# Patient Record
Sex: MALE | Race: WHITE | Employment: OTHER | URBAN - METROPOLITAN AREA
[De-identification: names, ages, dates, MRNs, and addresses within clinical notes are randomized per-mention and may not be internally consistent; named-entity substitution may affect disease eponyms.]

---

## 2017-01-04 ENCOUNTER — ANESTHESIA EVENT (OUTPATIENT)
Dept: MEDSURG UNIT | Age: 72
End: 2017-01-04
Payer: MEDICARE

## 2017-01-05 ENCOUNTER — ANESTHESIA (OUTPATIENT)
Dept: MEDSURG UNIT | Age: 72
End: 2017-01-05
Payer: MEDICARE

## 2017-01-05 ENCOUNTER — SURGERY (OUTPATIENT)
Age: 72
End: 2017-01-05

## 2017-01-05 PROCEDURE — 74011000250 HC RX REV CODE- 250

## 2017-01-05 PROCEDURE — 74011250636 HC RX REV CODE- 250/636: Performed by: ANESTHESIOLOGY

## 2017-01-05 PROCEDURE — 74011250636 HC RX REV CODE- 250/636: Performed by: PHYSICIAN ASSISTANT

## 2017-01-05 PROCEDURE — 74011250636 HC RX REV CODE- 250/636

## 2017-01-05 PROCEDURE — 77030008684 HC TU ET CUF COVD -B: Performed by: ANESTHESIOLOGY

## 2017-01-05 PROCEDURE — 77030013079 HC BLNKT BAIR HGGR 3M -A: Performed by: ANESTHESIOLOGY

## 2017-01-05 PROCEDURE — 77030026438 HC STYL ET INTUB CARD -A: Performed by: ANESTHESIOLOGY

## 2017-01-05 RX ORDER — MIDAZOLAM HYDROCHLORIDE 1 MG/ML
INJECTION, SOLUTION INTRAMUSCULAR; INTRAVENOUS AS NEEDED
Status: DISCONTINUED | OUTPATIENT
Start: 2017-01-05 | End: 2017-01-05 | Stop reason: HOSPADM

## 2017-01-05 RX ORDER — ONDANSETRON 2 MG/ML
INJECTION INTRAMUSCULAR; INTRAVENOUS AS NEEDED
Status: DISCONTINUED | OUTPATIENT
Start: 2017-01-05 | End: 2017-01-05 | Stop reason: HOSPADM

## 2017-01-05 RX ORDER — FENTANYL CITRATE 50 UG/ML
INJECTION, SOLUTION INTRAMUSCULAR; INTRAVENOUS AS NEEDED
Status: DISCONTINUED | OUTPATIENT
Start: 2017-01-05 | End: 2017-01-05 | Stop reason: HOSPADM

## 2017-01-05 RX ORDER — DEXAMETHASONE SODIUM PHOSPHATE 4 MG/ML
INJECTION, SOLUTION INTRA-ARTICULAR; INTRALESIONAL; INTRAMUSCULAR; INTRAVENOUS; SOFT TISSUE AS NEEDED
Status: DISCONTINUED | OUTPATIENT
Start: 2017-01-05 | End: 2017-01-05 | Stop reason: HOSPADM

## 2017-01-05 RX ORDER — NEOSTIGMINE METHYLSULFATE 1 MG/ML
INJECTION INTRAVENOUS AS NEEDED
Status: DISCONTINUED | OUTPATIENT
Start: 2017-01-05 | End: 2017-01-05 | Stop reason: HOSPADM

## 2017-01-05 RX ORDER — HYDROMORPHONE HYDROCHLORIDE 2 MG/ML
INJECTION, SOLUTION INTRAMUSCULAR; INTRAVENOUS; SUBCUTANEOUS AS NEEDED
Status: DISCONTINUED | OUTPATIENT
Start: 2017-01-05 | End: 2017-01-05 | Stop reason: HOSPADM

## 2017-01-05 RX ORDER — PHENYLEPHRINE HCL IN 0.9% NACL 0.4MG/10ML
SYRINGE (ML) INTRAVENOUS AS NEEDED
Status: DISCONTINUED | OUTPATIENT
Start: 2017-01-05 | End: 2017-01-05 | Stop reason: HOSPADM

## 2017-01-05 RX ORDER — GLYCOPYRROLATE 0.2 MG/ML
INJECTION INTRAMUSCULAR; INTRAVENOUS AS NEEDED
Status: DISCONTINUED | OUTPATIENT
Start: 2017-01-05 | End: 2017-01-05 | Stop reason: HOSPADM

## 2017-01-05 RX ORDER — LIDOCAINE HYDROCHLORIDE 20 MG/ML
INJECTION, SOLUTION EPIDURAL; INFILTRATION; INTRACAUDAL; PERINEURAL AS NEEDED
Status: DISCONTINUED | OUTPATIENT
Start: 2017-01-05 | End: 2017-01-05 | Stop reason: HOSPADM

## 2017-01-05 RX ORDER — PROPOFOL 10 MG/ML
INJECTION, EMULSION INTRAVENOUS AS NEEDED
Status: DISCONTINUED | OUTPATIENT
Start: 2017-01-05 | End: 2017-01-05 | Stop reason: HOSPADM

## 2017-01-05 RX ORDER — SUCCINYLCHOLINE CHLORIDE 20 MG/ML
INJECTION INTRAMUSCULAR; INTRAVENOUS AS NEEDED
Status: DISCONTINUED | OUTPATIENT
Start: 2017-01-05 | End: 2017-01-05 | Stop reason: HOSPADM

## 2017-01-05 RX ORDER — ROCURONIUM BROMIDE 10 MG/ML
INJECTION, SOLUTION INTRAVENOUS AS NEEDED
Status: DISCONTINUED | OUTPATIENT
Start: 2017-01-05 | End: 2017-01-05 | Stop reason: HOSPADM

## 2017-01-05 RX ADMIN — HYDROMORPHONE HYDROCHLORIDE 0.2 MG: 2 INJECTION, SOLUTION INTRAMUSCULAR; INTRAVENOUS; SUBCUTANEOUS at 09:04

## 2017-01-05 RX ADMIN — HYDROMORPHONE HYDROCHLORIDE 0.2 MG: 2 INJECTION, SOLUTION INTRAMUSCULAR; INTRAVENOUS; SUBCUTANEOUS at 09:06

## 2017-01-05 RX ADMIN — ONDANSETRON 4 MG: 2 INJECTION INTRAMUSCULAR; INTRAVENOUS at 10:20

## 2017-01-05 RX ADMIN — NEOSTIGMINE METHYLSULFATE 0.5 MG: 1 INJECTION INTRAVENOUS at 10:20

## 2017-01-05 RX ADMIN — GLYCOPYRROLATE 0.2 MG: 0.2 INJECTION INTRAMUSCULAR; INTRAVENOUS at 08:22

## 2017-01-05 RX ADMIN — ROCURONIUM BROMIDE 30 MG: 10 INJECTION, SOLUTION INTRAVENOUS at 08:37

## 2017-01-05 RX ADMIN — SODIUM CHLORIDE, SODIUM LACTATE, POTASSIUM CHLORIDE AND CALCIUM CHLORIDE 3000 ML: 600; 310; 30; 20 SOLUTION IRRIGATION at 09:22

## 2017-01-05 RX ADMIN — PROPOFOL 50 MG: 10 INJECTION, EMULSION INTRAVENOUS at 09:52

## 2017-01-05 RX ADMIN — FENTANYL CITRATE 50 MCG: 50 INJECTION, SOLUTION INTRAMUSCULAR; INTRAVENOUS at 09:10

## 2017-01-05 RX ADMIN — Medication 80 MCG: at 08:48

## 2017-01-05 RX ADMIN — PROPOFOL 70 MG: 10 INJECTION, EMULSION INTRAVENOUS at 08:29

## 2017-01-05 RX ADMIN — SUCCINYLCHOLINE CHLORIDE 140 MG: 20 INJECTION INTRAMUSCULAR; INTRAVENOUS at 08:29

## 2017-01-05 RX ADMIN — FENTANYL CITRATE 50 MCG: 50 INJECTION, SOLUTION INTRAMUSCULAR; INTRAVENOUS at 08:40

## 2017-01-05 RX ADMIN — ROCURONIUM BROMIDE 5 MG: 10 INJECTION, SOLUTION INTRAVENOUS at 08:29

## 2017-01-05 RX ADMIN — LIDOCAINE HYDROCHLORIDE 80 MG: 20 INJECTION, SOLUTION EPIDURAL; INFILTRATION; INTRACAUDAL; PERINEURAL at 08:29

## 2017-01-05 RX ADMIN — MIDAZOLAM HYDROCHLORIDE 2 MG: 1 INJECTION, SOLUTION INTRAMUSCULAR; INTRAVENOUS at 08:22

## 2017-01-05 RX ADMIN — CEFAZOLIN 2 G: 1 INJECTION, POWDER, FOR SOLUTION INTRAMUSCULAR; INTRAVENOUS; PARENTERAL at 08:40

## 2017-01-05 RX ADMIN — SODIUM CHLORIDE, SODIUM LACTATE, POTASSIUM CHLORIDE, AND CALCIUM CHLORIDE: 600; 310; 30; 20 INJECTION, SOLUTION INTRAVENOUS at 10:22

## 2017-01-05 RX ADMIN — FENTANYL CITRATE 25 MCG: 50 INJECTION, SOLUTION INTRAMUSCULAR; INTRAVENOUS at 09:52

## 2017-01-05 RX ADMIN — FENTANYL CITRATE 25 MCG: 50 INJECTION, SOLUTION INTRAMUSCULAR; INTRAVENOUS at 09:24

## 2017-01-05 RX ADMIN — GLYCOPYRROLATE 0.4 MG: 0.2 INJECTION INTRAMUSCULAR; INTRAVENOUS at 10:20

## 2017-01-05 RX ADMIN — Medication 80 MCG: at 10:02

## 2017-01-05 RX ADMIN — FENTANYL CITRATE 50 MCG: 50 INJECTION, SOLUTION INTRAMUSCULAR; INTRAVENOUS at 09:02

## 2017-01-05 RX ADMIN — DEXAMETHASONE SODIUM PHOSPHATE 8 MG: 4 INJECTION, SOLUTION INTRA-ARTICULAR; INTRALESIONAL; INTRAMUSCULAR; INTRAVENOUS; SOFT TISSUE at 08:29

## 2017-01-05 RX ADMIN — SODIUM CHLORIDE, SODIUM LACTATE, POTASSIUM CHLORIDE, AND CALCIUM CHLORIDE 3000 ML: 600; 310; 30; 20 INJECTION, SOLUTION INTRAVENOUS at 09:21

## 2017-01-05 NOTE — ANESTHESIA POSTPROCEDURE EVALUATION
Post-Anesthesia Evaluation and Assessment    Patient: Andrew Nguyen MRN: [id number]  SSN: [id number]    YOB: 1945  Age: 80 y.o. Sex: male       Cardiovascular Function/Vital Signs  Visit Vitals    /66    Pulse 71    Temp 36.4 °C (97.5 °F)    Resp 16    Ht 5' 10\" (1.778 m)    Wt 74.8 kg (164 lb 14.5 oz)    SpO2 95%    BMI 23.66 kg/m2       Patient is status post general anesthesia for Procedure(s):  RIGHT SHOULDER ARTHROSCOPY SUBACROMIAL DECOMPRESSION, DISTAL CLAVICLE EXCISION, ROTATOR CUFF REPAIR. Nausea/Vomiting: None    Postoperative hydration reviewed and adequate. Pain:  Pain Scale 1: Numeric (0 - 10) (01/05/17 0650)  Pain Intensity 1: 4 (01/05/17 0650)   Managed    Neurological Status:   Neuro (WDL): Within Defined Limits (01/05/17 0823)   At baseline    Mental Status and Level of Consciousness: Arousable    Pulmonary Status:   O2 Device: Nasal cannula (01/05/17 1046)   Adequate oxygenation and airway patent    Complications related to anesthesia: None    Post-anesthesia assessment completed.  No concerns    Signed By: Clint Ramírez MD     January 5, 2017

## 2017-01-05 NOTE — ANESTHESIA PREPROCEDURE EVALUATION
Anesthetic History   No history of anesthetic complications            Review of Systems / Medical History  Patient summary reviewed, nursing notes reviewed and pertinent labs reviewed    Pulmonary            Asthma        Neuro/Psych   Within defined limits           Cardiovascular            Dysrhythmias            GI/Hepatic/Renal  Within defined limits              Endo/Other        Arthritis     Other Findings            Physical Exam    Airway  Mallampati: II  TM Distance: > 6 cm  Neck ROM: normal range of motion   Mouth opening: Normal     Cardiovascular  Regular rate and rhythm,  S1 and S2 normal,  no murmur, click, rub, or gallop             Dental  No notable dental hx       Pulmonary  Breath sounds clear to auscultation               Abdominal  GI exam deferred       Other Findings            Anesthetic Plan    ASA: 3  Patient did not consent to regional anesthesiaAnesthesia type: general          Induction: Intravenous  Anesthetic plan and risks discussed with: Patient

## 2017-04-13 ENCOUNTER — HOSPITAL ENCOUNTER (OUTPATIENT)
Dept: ULTRASOUND IMAGING | Age: 72
Discharge: HOME | End: 2017-04-13
Attending: FAMILY MEDICINE
Payer: MEDICARE

## 2017-04-13 DIAGNOSIS — R11.0 NAUSEA: ICD-10-CM

## 2017-04-13 PROCEDURE — 76705 ECHO EXAM OF ABDOMEN: CPT

## 2017-04-18 ENCOUNTER — HOSPITAL ENCOUNTER (OUTPATIENT)
Dept: MRI IMAGING | Age: 72
Discharge: HOME | End: 2017-04-18
Attending: ORTHOPAEDIC SURGERY
Payer: MEDICARE

## 2017-04-18 DIAGNOSIS — S46.011A TRAUMATIC TEAR OF RIGHT ROTATOR CUFF, INITIAL ENCOUNTER: ICD-10-CM

## 2017-04-18 PROCEDURE — 73221 MRI JOINT UPR EXTREM W/O DYE: CPT

## 2017-04-26 ENCOUNTER — HOSPITAL ENCOUNTER (OUTPATIENT)
Age: 72
Setting detail: OUTPATIENT SURGERY
Discharge: HOME | End: 2017-04-26
Attending: INTERNAL MEDICINE | Admitting: INTERNAL MEDICINE
Payer: MEDICARE

## 2017-04-26 ENCOUNTER — ANESTHESIA (OUTPATIENT)
Dept: ENDOSCOPY | Age: 72
End: 2017-04-26
Payer: MEDICARE

## 2017-04-26 ENCOUNTER — ANESTHESIA EVENT (OUTPATIENT)
Dept: ENDOSCOPY | Age: 72
End: 2017-04-26
Payer: MEDICARE

## 2017-04-26 VITALS
TEMPERATURE: 97.8 F | OXYGEN SATURATION: 99 % | SYSTOLIC BLOOD PRESSURE: 155 MMHG | RESPIRATION RATE: 15 BRPM | HEART RATE: 55 BPM | DIASTOLIC BLOOD PRESSURE: 68 MMHG

## 2017-04-26 PROCEDURE — 74011000250 HC RX REV CODE- 250

## 2017-04-26 PROCEDURE — 74011250636 HC RX REV CODE- 250/636

## 2017-04-26 PROCEDURE — 76040000019: Performed by: INTERNAL MEDICINE

## 2017-04-26 PROCEDURE — 88305 TISSUE EXAM BY PATHOLOGIST: CPT | Performed by: INTERNAL MEDICINE

## 2017-04-26 PROCEDURE — 77030009426 HC FCPS BIOP ENDOSC BSC -B: Performed by: INTERNAL MEDICINE

## 2017-04-26 PROCEDURE — 76060000031 HC ANESTHESIA FIRST 0.5 HR: Performed by: INTERNAL MEDICINE

## 2017-04-26 RX ORDER — SODIUM CHLORIDE 9 MG/ML
150 INJECTION, SOLUTION INTRAVENOUS CONTINUOUS
Status: DISCONTINUED | OUTPATIENT
Start: 2017-04-26 | End: 2017-04-26 | Stop reason: HOSPADM

## 2017-04-26 RX ORDER — ATROPINE SULFATE 0.1 MG/ML
0.5 INJECTION INTRAVENOUS
Status: DISCONTINUED | OUTPATIENT
Start: 2017-04-26 | End: 2017-04-26 | Stop reason: HOSPADM

## 2017-04-26 RX ORDER — SODIUM CHLORIDE 0.9 % (FLUSH) 0.9 %
5-10 SYRINGE (ML) INJECTION AS NEEDED
Status: DISCONTINUED | OUTPATIENT
Start: 2017-04-26 | End: 2017-04-26 | Stop reason: HOSPADM

## 2017-04-26 RX ORDER — SODIUM CHLORIDE 0.9 % (FLUSH) 0.9 %
5-10 SYRINGE (ML) INJECTION EVERY 8 HOURS
Status: DISCONTINUED | OUTPATIENT
Start: 2017-04-26 | End: 2017-04-26 | Stop reason: HOSPADM

## 2017-04-26 RX ORDER — SODIUM CHLORIDE 9 MG/ML
INJECTION, SOLUTION INTRAVENOUS
Status: DISCONTINUED | OUTPATIENT
Start: 2017-04-26 | End: 2017-04-26 | Stop reason: HOSPADM

## 2017-04-26 RX ORDER — DEXTROMETHORPHAN/PSEUDOEPHED 2.5-7.5/.8
1.2 DROPS ORAL
Status: DISCONTINUED | OUTPATIENT
Start: 2017-04-26 | End: 2017-04-26 | Stop reason: HOSPADM

## 2017-04-26 RX ORDER — EPINEPHRINE 0.1 MG/ML
1 INJECTION INTRACARDIAC; INTRAVENOUS
Status: DISCONTINUED | OUTPATIENT
Start: 2017-04-26 | End: 2017-04-26 | Stop reason: HOSPADM

## 2017-04-26 RX ORDER — LIDOCAINE HYDROCHLORIDE 20 MG/ML
INJECTION, SOLUTION EPIDURAL; INFILTRATION; INTRACAUDAL; PERINEURAL AS NEEDED
Status: DISCONTINUED | OUTPATIENT
Start: 2017-04-26 | End: 2017-04-26 | Stop reason: HOSPADM

## 2017-04-26 RX ORDER — PROPOFOL 10 MG/ML
INJECTION, EMULSION INTRAVENOUS AS NEEDED
Status: DISCONTINUED | OUTPATIENT
Start: 2017-04-26 | End: 2017-04-26 | Stop reason: HOSPADM

## 2017-04-26 RX ORDER — MIDAZOLAM HYDROCHLORIDE 1 MG/ML
.25-5 INJECTION, SOLUTION INTRAMUSCULAR; INTRAVENOUS
Status: DISCONTINUED | OUTPATIENT
Start: 2017-04-26 | End: 2017-04-26 | Stop reason: HOSPADM

## 2017-04-26 RX ADMIN — PROPOFOL 100 MG: 10 INJECTION, EMULSION INTRAVENOUS at 14:04

## 2017-04-26 RX ADMIN — SODIUM CHLORIDE: 9 INJECTION, SOLUTION INTRAVENOUS at 13:52

## 2017-04-26 RX ADMIN — LIDOCAINE HYDROCHLORIDE 40 MG: 20 INJECTION, SOLUTION EPIDURAL; INFILTRATION; INTRACAUDAL; PERINEURAL at 14:00

## 2017-04-26 RX ADMIN — PROPOFOL 100 MG: 10 INJECTION, EMULSION INTRAVENOUS at 14:00

## 2017-04-26 NOTE — ANESTHESIA PREPROCEDURE EVALUATION
Anesthetic History   No history of anesthetic complications            Review of Systems / Medical History  Patient summary reviewed, nursing notes reviewed and pertinent labs reviewed    Pulmonary  Within defined limits          Asthma        Neuro/Psych   Within defined limits           Cardiovascular  Within defined limits          Dysrhythmias            GI/Hepatic/Renal  Within defined limits   GERD           Endo/Other  Within defined limits           Other Findings              Physical Exam    Airway  Mallampati: II  TM Distance: > 6 cm  Neck ROM: normal range of motion   Mouth opening: Normal     Cardiovascular  Regular rate and rhythm,  S1 and S2 normal,  no murmur, click, rub, or gallop             Dental  No notable dental hx       Pulmonary  Breath sounds clear to auscultation               Abdominal  GI exam deferred       Other Findings            Anesthetic Plan    ASA: 2  Anesthesia type: MAC          Induction: Intravenous  Anesthetic plan and risks discussed with: Patient

## 2017-04-26 NOTE — ROUTINE PROCESS
[facility]  1945  [id number]    Situation:  Verbal report received from: Arline PASTRANA  Procedure: Procedure(s):  ESOPHAGOGASTRODUODENOSCOPY (EGD)  ESOPHAGOGASTRODUODENAL (EGD) BIOPSY    Background:    Preoperative diagnosis: NAUSEA   Postoperative diagnosis: 1. Hiatal Hernia  2. Reflux Esophagitis    :  Dr. Zainab Watkins  Assistant(s): Endoscopy Technician-1: Rikcy Cherry  Endoscopy RN-1: Ana Billy RN    Specimens:   ID Type Source Tests Collected by Time Destination   1 : Lower Esophagus Biopsy Preservative   Eloisa Multani MD 4/26/2017 1409 Pathology     H. Pylori  no    Assessment:  Intra-procedure medications   Anesthesia gave intra-procedure sedation and medications, see anesthesia flow sheet yes    Intravenous fluids: NS@ KVO     Vital signs stable     Abdominal assessment: round and soft     Recommendation:  Discharge patient per MD order.     Family or Friend   Permission to share finding with family or friend yes

## 2017-04-26 NOTE — H&P
[address], [address] [place] [address]          Pre-procedure History and Physical       NAME:  Alina Banuelos   :   1945   MRN:   [id number]     CHIEF COMPLAINT/HPI: See procedure note    PMH:  Past Medical History:   Diagnosis Date    Adverse effect of anesthesia     PT STATES \" I HAD VERY LOW BLOOD PRESSURE FOR A FEW WEEKS FOLLOWING SINUS SURGERY\"    Arrhythmia     OF UNKNOWN TYPE PER PATIENT, PT STOPPED GOING TO CARDIOLOGIST    Arthritis     Asthma     AS A CHILD    CFS (chronic fatigue syndrome)     Contact dermatitis and other eczema, due to unspecified cause     GERD (gastroesophageal reflux disease)     Hiatal hernia     Rotator cuff disorder     right       PSH:  Past Surgical History:   Procedure Laterality Date    ABDOMEN SURGERY PROC UNLISTED      HERNIA REPAIR    HX APPENDECTOMY      HX COLONOSCOPY      HX HEENT  2014    SINUS SURGERY- \"CLEANING OUT\" INFECTION OF SINUSES    HX ORTHOPAEDIC Right     SHOULDER REPAIR ( PT DOESN'T KNOW)     HX ORTHOPAEDIC      AT AGE 19 , BONE SPURS/NODULE REMOVED NEAR LEFT FEMUR    HX RETINAL DETACHMENT REPAIR      LEFT EYE    HX TONSILLECTOMY         Allergies: Allergies   Allergen Reactions    Latex Itching     AND HIVES    Sulfa (Sulfonamide Antibiotics) Anaphylaxis    Broccoli Nausea and Vomiting    Chlorhexidine Rash    Diltiazem Nausea Only    Gluten Other (comments)     \" WANTS TO SLEEP 24 HOURS A DAY , AND IT AFFECTS MY HEART\", PT CAN'T ELABORATE.  Ibuprofen Nausea and Vomiting    Lactose Other (comments)     EXTREME INDIGESTION    Propafenone Other (comments)     Nausea and pain      Shellfish Derived Other (comments)     STOMACH UPSET    Tapioca Nausea and Vomiting    Wheat Other (comments)     \" WANTS TO SLEEP 24 HOURS A DAY\"       Home Medications:  Prior to Admission Medications   Prescriptions Last Dose Informant Patient Reported?  Taking?   acetaminophen-codeine (TYLENOL #3) 300-30 mg per tablet   No No   Sig: Take 1 Tab by mouth every four (4) hours as needed for Pain. Max Daily Amount: 6 Tabs. aspirin delayed-release 81 mg tablet   Yes No   Sig: Take 1 Tab by mouth daily. calcium carbonate (TUMS) 200 mg calcium (500 mg) chew   Yes No   Sig: Take 1 Tab by mouth as needed. ondansetron (ZOFRAN ODT) 4 mg disintegrating tablet   No No   Sig: Take 1 Tab by mouth every eight (8) hours as needed for Nausea. oxyCODONE-acetaminophen (PERCOCET) 5-325 mg per tablet   No No   Sig: Take 1-2 Tabs by mouth every four (4) hours as needed for Pain. Max Daily Amount: 12 Tabs.       Facility-Administered Medications: None       Hospital Medications:  Current Facility-Administered Medications   Medication Dose Route Frequency    0.9% sodium chloride infusion  150 mL/hr IntraVENous CONTINUOUS    sodium chloride (NS) flush 5-10 mL  5-10 mL IntraVENous Q8H    sodium chloride (NS) flush 5-10 mL  5-10 mL IntraVENous PRN    midazolam (VERSED) injection 0.25-5 mg  0.25-5 mg IntraVENous Multiple    simethicone (MYLICON) 28AA/0.2DC oral drops 80 mg  1.2 mL Oral Multiple    atropine injection 0.5 mg  0.5 mg IntraVENous ONCE PRN    EPINEPHrine (ADRENALIN) 0.1 mg/mL syringe 1 mg  1 mg Endoscopically ONCE PRN     Facility-Administered Medications Ordered in Other Encounters   Medication Dose Route Frequency    0.9% sodium chloride infusion   IntraVENous CONTINUOUS       Family History:  Family History   Problem Relation Age of Onset    Hypertension Mother     Stroke Mother     Diabetes Mother     Heart Disease Mother     Cancer Father      ABDOMINAL CANCER    Anesth Problems Neg Hx        Social History:  Social History   Substance Use Topics    Smoking status: Never Smoker    Smokeless tobacco: Never Used    Alcohol use No         PHYSICAL EXAM PRIOR TO SEDATION:  General: Alert, in no acute distress    Lungs:            CTA bilaterally  Heart:  Normal S1, S2    Abdomen: Soft, Non distended, Non tender. Normoactive bowel sounds. Assessment:   Stable for sedation administration.     Plan:   · Endoscopic procedure with sedation     Signed By: Kia Oh MD     4/26/2017  1:59 PM

## 2017-04-26 NOTE — IP AVS SNAPSHOT
[address] [address] 
[phone] Patient: Rosa Banuelos MRN: [id number] You are allergic to the following Allergen Reactions Latex Itching AND HIVES Sulfa (Sulfonamide Antibiotics) Anaphylaxis Broccoli Nausea and Vomiting Chlorhexidine Rash Diltiazem Nausea Only Gluten Other (comments) \" WANTS TO SLEEP 24 HOURS A DAY , AND IT AFFECTS MY HEART\", PT CAN'T ELABORATE. Ibuprofen Nausea and Vomiting Lactose Other (comments) EXTREME INDIGESTION Propafenone Other (comments) Nausea and pain Shellfish Derived Other (comments) STOMACH UPSET Tapioca Nausea and Vomiting Wheat Other (comments) \" WANTS TO SLEEP 24 HOURS A DAY\" Recent Documentation Smoking Status Never Smoker Emergency Contacts Name Discharge Info Relation Home Work Mobile Gillian Barrera  Daughter [21] [phone] Maria Eugenia Giraldo CAREGIVER [3] Friend [5] [phone] About your hospitalization You were admitted on:  April 26, 2017 You last received care in the:  [facility] ENDOSCOPY You were discharged on:  April 26, 2017 Unit phone number:  [phone] Why you were hospitalized Your primary diagnosis was:  Not on File Providers Seen During Your Hospitalizations Provider Role Specialty Primary office phone Glenn Reyes MD Attending Provider Gastroenterology [phone] Your Primary Care Physician (PCP) Primary Care Physician Office Phone Office Fax Brenda Barnes [phone] Follow-up Information None Current Discharge Medication List  
  
CONTINUE these medications which have NOT CHANGED Dose & Instructions Dispensing Information Comments Morning Noon Evening Bedtime acetaminophen-codeine 300-30 mg per tablet Commonly known as:  TYLENOL #3 Your last dose was: Your next dose is:    
   
   
 Dose:  1 Tab Take 1 Tab by mouth every four (4) hours as needed for Pain. Max Daily Amount: 6 Tabs. Quantity:  40 Tab Refills:  0  
     
   
   
   
  
 aspirin delayed-release 81 mg tablet Your last dose was: Your next dose is:    
   
   
 Dose:  81 mg Take 1 Tab by mouth daily. Quantity:  30 Tab Refills:  1  
     
   
   
   
  
 calcium carbonate 200 mg calcium (500 mg) Chew Commonly known as:  TUMS Your last dose was: Your next dose is:    
   
   
 Dose:  1 Tab Take 1 Tab by mouth as needed. Refills:  0  
     
   
   
   
  
 ondansetron 4 mg disintegrating tablet Commonly known as:  ZOFRAN ODT Your last dose was: Your next dose is:    
   
   
 Dose:  4 mg Take 1 Tab by mouth every eight (8) hours as needed for Nausea. Quantity:  12 Tab Refills:  2  
     
   
   
   
  
 oxyCODONE-acetaminophen 5-325 mg per tablet Commonly known as:  PERCOCET Your last dose was: Your next dose is:    
   
   
 Dose:  1-2 Tab Take 1-2 Tabs by mouth every four (4) hours as needed for Pain. Max Daily Amount: 12 Tabs. Quantity:  40 Tab Refills:  0 Discharge Instructions [address] [place] [address] Grant Richardson. Zainab Watkins M.D. 
[address] [place] [address], [address] [place] [address] 
[phone] EGD DISCHARGE INSTRUCTIONS [facility] 
[id number] 1945 DISCOMFORT: 
Sore throat- throat lozenges or warm salt water gargle Redness at IV site- apply warm compress to area; if redness or soreness persist- contact your physician Gaseous discomfort- walking, belching will help relieve any discomfort You may not operate a vehicle for 12 hours You may not engage in an occupation involving machinery or appliances for the  rest of today You may not drink alcoholic beverages for at least 12 hours Avoid making any critical decisions for at least 24 hours DIET: 
 You may resume your normal diet, but some patients find that heavy or large  meals may lead to indigestion or vomiting. I suggest a light meal as first food  intake. ACTIVITY: 
You may resume your normal daily activities. It is recommended that you spend the remainder of the day resting - avoid any strenuous activity. CALL KALAYN Butler ANY SIGN OF: Increasing pain, nausea, vomiting Abdominal distension (swelling) Significant bleeding (oral or rectal) Fever Pain in chest area Shortness of breath Additional Instructions: 
 Call Dr. Delfina Ochoa if any questions or problems at [phone] Setup follow-up office visit in 2-3 weeks EGD with reflux esophagitis and large hiatal hernia which I could not pass. Trial of OTC Omeprazole 20 mg daily. Discharge Orders None Introducing [facility]! Lucille Horvath introduces [employer] patient portal. Now you can access parts of your medical record, email your doctor's office, and request medication refills online. 1. In your internet browser, go to [email or web address] 2. Click on the First Time User? Click Here link in the Sign In box. You will see the New Member Sign Up page. 3. Enter your [employer] Access Code exactly as it appears below. You will not need to use this code after youve completed the sign-up process. If you do not sign up before the expiration date, you must request a new code. · [employer] Access Code: [email or web address] Expires: 5/27/2017 12:56 PM 
 
4. Enter the last four digits of your Social Security Number (xxxx) and Date of Birth (mm/dd/yyyy) as indicated and click Submit. You will be taken to the next sign-up page. 5. Create a [employer] ID. This will be your [employer] login ID and cannot be changed, so think of one that is secure and easy to remember. 6. Create a [employer] password. You can change your password at any time. 7. Enter your Password Reset Question and Answer. This can be used at a later time if you forget your password. 8. Enter your e-mail address. You will receive e-mail notification when new information is available in [address] 9. Click Sign Up. You can now view and download portions of your medical record. 10. Click the Download Summary menu link to download a portable copy of your medical information. If you have questions, please visit the Frequently Asked Questions section of the [employer] website. Remember, [employer] is NOT to be used for urgent needs. For medical emergencies, dial 911. Now available from your iPhone and Android! General Information Please provide this summary of care documentation to your next provider. Patient Signature:  ____________________________________________________________ Date:  ____________________________________________________________  
  
Salty Clark Provider Signature:  ____________________________________________________________ Date:  ____________________________________________________________

## 2017-04-26 NOTE — PROCEDURES
Glenis Chowdhury [address], M.D.  Familia Mendez, [facility]  [phone]               Esophagogastroduodenoscopy (EGD) Procedure Note    NAME: Rosa Banuelos  :  1945  MRN:  [id number]    Indications:  Nausea     : Glenn Reyes MD    Referring Provider:  Vanetta Gitelman, MD    Medicine:  [facility] anesthesia      Procedure Details:  After informed consent was obtained with all risks and benefits of the procedure explained and preprocedure exam completed, the patient was placed in the left lateral decubitus position. Universal protocol for patient identification was performed and documented in the nursing notes. Throughout the procedure, the patient's blood pressure was monitored at least every five minutes; pulse, and oxygen saturations were monitored continuously. All vital signs were documented in the nursing notes. The endoscope was inserted into the mouth and advanced under direct vision to stomach. A careful inspection was made as the gastroscope was withdrawn, including a retroflexed view of the proximal stomach was NOT performed; findings and interventions are described below. Findings:   Esophagus: LA Grade B reflux esophagitis s/p biopsies  Stomach: large hiatal hernia-unable to advance the scope beyond the hernia due to looping after multiple attempts  Duodenum: not intubated    Interventions:    biopsy of esophagus    Specimens:     ID Type Source Tests Collected by Time Destination   1 : Lower Esophagus Biopsy Preservative   Gelnn Reyes MD 2017 1409 Pathology        EBL: None          Complications:     No immediate complications        Impression:  -As above. Recommendations:  -Await pathology. Trial of PPI 20 mg daily. Signed by:  Glenn Reyes MD         2017 2:16 PM

## 2017-04-26 NOTE — ANESTHESIA POSTPROCEDURE EVALUATION
Post-Anesthesia Evaluation and Assessment    Patient: Wilfredo Valle MRN: [id number]  SSN: [id number]    YOB: 1945  Age: 71 y.o. Sex: male       Cardiovascular Function/Vital Signs  Visit Vitals    /77    Pulse 60    Temp 36.6 °C (97.8 °F)    Resp 28    SpO2 98%       Patient is status post MAC anesthesia for Procedure(s):  ESOPHAGOGASTRODUODENOSCOPY (EGD)  ESOPHAGOGASTRODUODENAL (EGD) BIOPSY. Nausea/Vomiting: None    Postoperative hydration reviewed and adequate. Pain:  Pain Scale 1: Numeric (0 - 10) (04/26/17 1431)  Pain Intensity 1: 0 (04/26/17 1431)   Managed    Neurological Status: At baseline    Mental Status and Level of Consciousness: Arousable    Pulmonary Status:   O2 Device: Room air (04/26/17 1431)   Adequate oxygenation and airway patent    Complications related to anesthesia: None    Post-anesthesia assessment completed.  No concerns    Signed By: Ernie Toth MD     April 26, 2017

## 2017-04-26 NOTE — DISCHARGE INSTRUCTIONS
Glenis Love [address]rien M.D.  [place] [address] [address]  [phone]         EGD DISCHARGE INSTRUCTIONS      Nuha Valles  [id number]  1945    DISCOMFORT:  Sore throat- throat lozenges or warm salt water gargle  Redness at IV site- apply warm compress to area; if redness or soreness persist- contact your physician  Gaseous discomfort- walking, belching will help relieve any discomfort  You may not operate a vehicle for 12 hours  You may not engage in an occupation involving machinery or appliances for the  rest of today  You may not drink alcoholic beverages for at least 12 hours  Avoid making any critical decisions for at least 24 hours    DIET:   You may resume your normal diet, but some patients find that heavy or large  meals may lead to indigestion or vomiting. I suggest a light meal as first food  intake. ACTIVITY:  You may resume your normal daily activities. It is recommended that you spend the remainder of the day resting - avoid any strenuous activity. CALL KALYAN Gallardo ANY SIGN OF:   Increasing pain, nausea, vomiting  Abdominal distension (swelling)  Significant bleeding (oral or rectal)  Fever   Pain in chest area  Shortness of breath    Additional Instructions:   Call Dr. Angeles Obrien if any questions or problems at [phone]   Setup follow-up office visit in 2-3 weeks  EGD with reflux esophagitis and large hiatal hernia which I could not pass. Trial of OTC Omeprazole 20 mg daily.

## 2017-05-19 ENCOUNTER — HOSPITAL ENCOUNTER (OUTPATIENT)
Dept: NUCLEAR MEDICINE | Age: 72
Discharge: HOME | End: 2017-05-19
Attending: INTERNAL MEDICINE
Payer: MEDICARE

## 2017-05-19 VITALS — BODY MASS INDEX: 25.11 KG/M2 | WEIGHT: 175 LBS

## 2017-05-19 DIAGNOSIS — R11.0 NAUSEA: ICD-10-CM

## 2017-05-19 PROCEDURE — 78227 HEPATOBIL SYST IMAGE W/DRUG: CPT

## 2017-05-19 PROCEDURE — 74011250636 HC RX REV CODE- 250/636: Performed by: INTERNAL MEDICINE

## 2017-05-19 PROCEDURE — 74011000258 HC RX REV CODE- 258: Performed by: INTERNAL MEDICINE

## 2017-05-19 RX ORDER — SODIUM CHLORIDE 9 MG/ML
25 INJECTION, SOLUTION INTRAVENOUS
Status: COMPLETED | OUTPATIENT
Start: 2017-05-19 | End: 2017-05-19

## 2017-05-19 RX ORDER — SODIUM CHLORIDE 0.9 % (FLUSH) 0.9 %
10 SYRINGE (ML) INJECTION
Status: COMPLETED | OUTPATIENT
Start: 2017-05-19 | End: 2017-05-19

## 2017-05-19 RX ADMIN — SODIUM CHLORIDE 25 ML: 900 INJECTION, SOLUTION INTRAVENOUS at 12:05

## 2017-05-19 RX ADMIN — SINCALIDE 1.59 MCG: 5 INJECTION, POWDER, LYOPHILIZED, FOR SOLUTION INTRAVENOUS at 12:05

## 2017-05-19 RX ADMIN — Medication 10 ML: at 11:00

## 2017-06-06 ENCOUNTER — OFFICE VISIT (OUTPATIENT)
Dept: SURGERY | Age: 72
End: 2017-06-06

## 2017-06-06 VITALS
WEIGHT: 174 LBS | DIASTOLIC BLOOD PRESSURE: 70 MMHG | SYSTOLIC BLOOD PRESSURE: 118 MMHG | RESPIRATION RATE: 16 BRPM | HEART RATE: 82 BPM | OXYGEN SATURATION: 98 % | TEMPERATURE: 99.1 F | HEIGHT: 70 IN | BODY MASS INDEX: 24.91 KG/M2

## 2017-06-06 DIAGNOSIS — K21.00 GASTROESOPHAGEAL REFLUX DISEASE WITH ESOPHAGITIS: ICD-10-CM

## 2017-06-06 DIAGNOSIS — K82.8 BILIARY DYSKINESIA: ICD-10-CM

## 2017-06-06 DIAGNOSIS — K44.9 PARAESOPHAGEAL HERNIA: Primary | ICD-10-CM

## 2017-06-06 NOTE — PROGRESS NOTES
1. Have you been to the ER, urgent care clinic since your last visit? Hospitalized since your last visit? No    2. Have you seen or consulted any other health care providers outside of the [address] [place] [address] since your last visit? Include any pap smears or colon screening.  GI-Dr. Bola Pate

## 2017-06-06 NOTE — PROGRESS NOTES
Surgery Consult    Subjective:      Piedad Banuelos is a 71 y.o. male who is being seen for evaluation of abdominal pain. The pain is located in the RUQ without radiation. Pain is described as aching and measures 6/10 in intensity. Onset of pain was several months ago following right rotator cuff repair. Aggravating factors include eating. Alleviating factors include gluten-free diet. Associated symptoms include nausea and GERD symptoms. He denies emesis, fever, chills, change in bowel habits, dysphagia, or chest pain.     Patient Active Problem List    Diagnosis Date Noted    Biliary dyskinesia 06/06/2017    Paraesophageal hernia 06/06/2017    Fatigue 07/30/2015    Bradycardia 07/30/2015    SSS (sick sinus syndrome) ([facility]) 07/30/2015    Abnormal EKG 06/24/2015    GERD (gastroesophageal reflux disease) 01/31/2015    [facility] 07/23/2013     Past Medical History:   Diagnosis Date    Adverse effect of anesthesia     PT STATES \" I HAD VERY LOW BLOOD PRESSURE FOR A FEW WEEKS FOLLOWING SINUS SURGERY\"    Arrhythmia     OF UNKNOWN TYPE PER PATIENT, PT STOPPED GOING TO CARDIOLOGIST    Arthritis     Asthma     AS A CHILD    CFS (chronic fatigue syndrome)     Contact dermatitis and other eczema, due to unspecified cause     GERD (gastroesophageal reflux disease)     Hiatal hernia     Rotator cuff disorder     right      Past Surgical History:   Procedure Laterality Date    ABDOMEN SURGERY PROC UNLISTED      HERNIA REPAIR    HX APPENDECTOMY      HX COLONOSCOPY      HX HEENT  07/2014    SINUS SURGERY- \"CLEANING OUT\" INFECTION OF SINUSES    HX ORTHOPAEDIC Right 1988    SHOULDER REPAIR ( PT DOESN'T KNOW)     HX ORTHOPAEDIC      AT AGE 19 , BONE SPURS/NODULE REMOVED NEAR LEFT FEMUR    HX RETINAL DETACHMENT REPAIR      LEFT EYE    HX TONSILLECTOMY        Social History   Substance Use Topics    Smoking status: Never Smoker    Smokeless tobacco: Never Used    Alcohol use No Family History   Problem Relation Age of Onset    Hypertension Mother     Stroke Mother     Diabetes Mother     Heart Disease Mother     Cancer Father      ABDOMINAL CANCER    Anesth Problems Neg Hx       Current Outpatient Prescriptions   Medication Sig    aspirin delayed-release 81 mg tablet Take 1 Tab by mouth daily.  cephALEXin (KEFLEX) 250 mg capsule Take 1 Cap by mouth three (3) times daily.  acetaminophen-codeine (TYLENOL #3) 300-30 mg per tablet Take 1 Tab by mouth every four (4) hours as needed for Pain. Max Daily Amount: 6 Tabs.  ondansetron (ZOFRAN ODT) 4 mg disintegrating tablet Take 1 Tab by mouth every eight (8) hours as needed for Nausea.  oxyCODONE-acetaminophen (PERCOCET) 5-325 mg per tablet Take 1-2 Tabs by mouth every four (4) hours as needed for Pain. Max Daily Amount: 12 Tabs.  calcium carbonate (TUMS) 200 mg calcium (500 mg) chew Take 1 Tab by mouth as needed. No current facility-administered medications for this visit. Allergies   Allergen Reactions    Latex Itching     AND HIVES    Sulfa (Sulfonamide Antibiotics) Anaphylaxis    Broccoli Nausea and Vomiting    Chlorhexidine Rash    Diltiazem Nausea Only    Gluten Other (comments)     \" WANTS TO SLEEP 24 HOURS A DAY , AND IT AFFECTS MY HEART\", PT CAN'T ELABORATE.  Ibuprofen Nausea and Vomiting    Lactose Other (comments)     EXTREME INDIGESTION    Propafenone Other (comments)     Nausea and pain      Shellfish Derived Other (comments)     STOMACH UPSET    Tapioca Nausea and Vomiting    Wheat Other (comments)     \" WANTS TO SLEEP 24 HOURS A DAY\"       Review of Systems:    A complete review of systems was negative except as noted in the HPI.     Objective:        Visit Vitals    /70 (BP 1 Location: Left arm, BP Patient Position: Sitting)    Pulse 82    Temp 99.1 °F (37.3 °C) (Oral)    Resp 16    Ht 5' 10\" (1.778 m)    Wt 174 lb (78.9 kg)    SpO2 98%    BMI 24.97 kg/m2       Physical Exam:  GENERAL: alert, cooperative, no distress, appears stated age, EYE: negative, THROAT & NECK: normal, LUNG: clear to auscultation bilaterally, HEART: regular rate and rhythm, S1, S2 normal, no murmur. ABDOMEN: soft, non-distended. Bowel sounds normal. Focal right-sided pain with palpation. No masses,  no organomegaly, no hernia. EXTREMITIES:  extremities normal, atraumatic, no cyanosis or edema, SKIN: Normal., NEUROLOGIC: negative    Imaging:  reviewed  Ultrasound and HIDA, endoscopy    Lab/Data Review:  Endoscopy: Paraesophageal hernia; scope could not be passed into antrum/duodenum      Assessment:     Biliary dyskinesia: symptom reproduction with CCK at time of HIDA    Type III paraesophageal hernia, symptomatic    Plan:     1. I recommend proceeding with laparoscopic paraesophageal hernia repair with mesh with cholecystectomy and upper endoscopy. 2. Will obtain records from sinus surgery from [facility] (bradycardia, hypotension during procedure). 3. He agrees with this plan and desires to schedule.       Signed By: Lalitha Long MD     June 6, 2017

## 2017-06-06 NOTE — PATIENT INSTRUCTIONS
Nissen Fundoplication: What to Expect at [address] may be sore and have some pain in your belly for several weeks after surgery. If you had laparoscopic surgery, you also may have pain near your shoulder for a day or two after surgery. It may be hard for you to swallow for up to 6 weeks after the surgery. You may also have cramping in your belly, feel bloated, or pass more gas than before. When you burp, you may not get as much relief as you did before the surgery. The cramping and bloating usually go away in 2 to 3 months, but you may continue to pass more gas for a long time. Because the surgery makes your stomach a little smaller, you may get full more quickly when you eat. In 2 to 3 months, the stomach adjusts and you will be able to eat your usual amounts of food. How quickly you recover depends on whether you had a laparoscopic or open surgery. After laparoscopic surgery, most people can go back to work or their normal routine in about 2 to 3 weeks, depending on their work. After open surgery, you may need 4 to 6 weeks to get back to your normal routine. This care sheet gives you a general idea about how long it will take for you to recover. But each person recovers at a different pace. Follow the steps below to get better as quickly as possible. How can you care for yourself at home? Activity  · Rest when you feel tired. Getting enough sleep will help you recover. · Try to walk each day. Start out by walking a little more than you did the day before. Bit by bit, increase the amount you walk. Walking boosts blood flow and helps prevent pneumonia and constipation. · For about 2 weeks, or 4 to 6 weeks if you had an open surgery, avoid lifting objects heavier than about 15 to 20 pounds. This may include heavy grocery bags and milk containers, a heavy briefcase or backpack, cat litter or dog food bags, a vacuum , or a child.   · Do not do sit-ups or any exercise or activity that uses your belly muscles. · You can be active and do things around the house as you can tolerate it. Do not take part in any activity where you could be hit in the belly. This could be sports or playing with children. · You may shower. Pat your incisions dry. If you had an open surgery, you need to keep the incision dry until it begins to heal. Do not take baths until your doctor says it is okay. · Ask your doctor when you can drive again. · Ask your doctor when it is okay for you to have sex. Diet  · For the first three days, stay on a liquid diet. This includes broths, soups, milk shakes). After three days, start soft/pureed diet (puddings, yogurt, and mashed potatoes). Stay on this diet until your first office visit after surgery. · Have 5 or 6 small meals each day instead of 2 or 3 large meals. · Chew each bite of food very well. Eat slowly. You may need to take 20 to 30 minutes to eat a meal.  · If you feel full quickly, try to drink fluids between meals instead of with meals. · Avoid carbonated beverages, such as soda pop. · Avoid drinking with straws. This may help you swallow less air when you drink. · You may notice that your bowel movements are not regular right after your surgery. This is common. Try to avoid constipation and straining with bowel movements. Take a fiber supplement every day. If you have not had a bowel movement after a couple of days, ask your doctor about taking a mild laxative. Medicines  · Your doctor will tell you if and when you can restart your medicines. He or she will also give you instructions about taking any new medicines. · If you take blood thinners, such as warfarin (Coumadin), clopidogrel (Plavix), or aspirin, be sure to talk to your doctor. He or she will tell you if and when to start taking those medicines again. Make sure that you understand exactly what your doctor wants you to do. · Take pain medicines exactly as directed.   ¨ If the doctor gave you a prescription medicine for pain, take it as prescribed. ¨ If you are not taking a prescription pain medicine, ask your doctor if you can take an over-the-counter medicine. · If you think your pain medicine is making you sick to your stomach:  ¨ Take your medicine after meals (unless your doctor tells you not to). ¨ Ask your doctor for a different pain medicine. · If your doctor prescribed antibiotics, take them as directed. Do not stop taking them just because you feel better. You need to take the full course of antibiotics. · Continue to take your acid-reducing medicine for 1 month after surgery or as your doctor tells you. Incision care  · If you have strips of tape on the cuts the doctor made (incisions), leave the tape on for a week or until it falls off. · Wash the area daily with warm, soapy water and pat it dry. Don't use hydrogen peroxide or alcohol, which can slow healing. You may cover the area with a gauze bandage if it weeps or rubs against clothing. Change the bandage every day. · Keep the area clean and dry. Follow-up care is a key part of your treatment and safety. Be sure to make and go to all appointments, and call your doctor if you are having problems. It's also a good idea to know your test results and keep a list of the medicines you take. When should you call for help? Call 911 anytime you think you may need emergency care. For example, call if:  · You passed out (lost consciousness). · You have severe trouble breathing. · You have sudden chest pain and shortness of breath, or you cough up blood. · You have severe pain in your belly or chest.  Call your doctor now or seek immediate medical care if:  · You are sick to your stomach or cannot keep fluids down. · You have pain that does not get better after you take pain medicine. · You have signs of infection, such as:  ¨ Increased pain, swelling, warmth, or redness. ¨ Red streaks leading from the incision.   ¨ Pus draining from the incision. ¨ A fever. · You have loose stitches, or your incision comes open. · You have signs of a blood clot, such as:  ¨ Pain in your calf, back of the knee, thigh, or groin. ¨ Redness and swelling in your leg or groin. · You have trouble passing urine or stool, especially if you have pain or swelling in your lower belly. Watch closely for any changes in your health, and be sure to contact your doctor if:  · You have a cough that does not go away or one that brings up mucus. · You have shoulder pain that lasts more than 3 days. · You do not have a bowel movement after taking a laxative. Where can you learn more? Go to http://kallie-audrey.info/. Enter Q042 in the search box to learn more about \"Nissen Fundoplication: What to Expect at Home. \"  Current as of: August 9, 2016  Content Version: 11.2  © 9119-6561 [employer]. Care instructions adapted under license by [employer] (which disclaims liability or warranty for this information). If you have questions about a medical condition or this instruction, always ask your healthcare professional. [place] [address] any warranty or liability for your use of this information.

## 2017-06-08 ENCOUNTER — HOSPITAL ENCOUNTER (OUTPATIENT)
Dept: GENERAL RADIOLOGY | Age: 72
Discharge: HOME | End: 2017-06-08
Attending: FAMILY MEDICINE
Payer: MEDICARE

## 2017-06-08 DIAGNOSIS — R07.89 CHEST WALL PAIN: ICD-10-CM

## 2017-06-08 DIAGNOSIS — M25.519 SHOULDER PAIN: ICD-10-CM

## 2017-06-08 PROCEDURE — 73030 X-RAY EXAM OF SHOULDER: CPT

## 2017-06-08 PROCEDURE — 71020 XR CHEST PA LAT: CPT

## 2017-06-14 ENCOUNTER — TELEPHONE (OUTPATIENT)
Dept: SURGERY | Age: 72
End: 2017-06-14

## 2017-06-14 NOTE — TELEPHONE ENCOUNTER
Patient scheduled surgery and asked for someone to send him a list of the foods (diet) he is to start after surgery. He lives alone and will need to shop for food before surgery. Also, he asked me to notify anesthesia for a consult because he's had an episode in the past that caused his heart to \"almost stop\" during surgery. I've arrange for anesthesia to call patient today; he's been notified and is to wait for the call.

## 2017-06-16 ENCOUNTER — HOSPITAL ENCOUNTER (OUTPATIENT)
Dept: PREADMISSION TESTING | Age: 72
Discharge: HOME | End: 2017-06-16
Payer: MEDICARE

## 2017-06-16 VITALS
DIASTOLIC BLOOD PRESSURE: 73 MMHG | TEMPERATURE: 98 F | WEIGHT: 173.5 LBS | HEIGHT: 70 IN | BODY MASS INDEX: 24.84 KG/M2 | RESPIRATION RATE: 20 BRPM | SYSTOLIC BLOOD PRESSURE: 120 MMHG | HEART RATE: 66 BPM

## 2017-06-16 LAB
ALBUMIN SERPL BCP-MCNC: 3.5 G/DL (ref 3.5–5)
ALBUMIN/GLOB SERPL: 1.2 {RATIO} (ref 1.1–2.2)
ALP SERPL-CCNC: 63 U/L (ref 45–117)
ALT SERPL-CCNC: 18 U/L (ref 12–78)
ANION GAP BLD CALC-SCNC: 8 MMOL/L (ref 5–15)
AST SERPL W P-5'-P-CCNC: 9 U/L (ref 15–37)
BASOPHILS # BLD AUTO: 0 K/UL (ref 0–0.1)
BASOPHILS # BLD: 1 % (ref 0–1)
BILIRUB SERPL-MCNC: 0.3 MG/DL (ref 0.2–1)
BUN SERPL-MCNC: 19 MG/DL (ref 6–20)
BUN/CREAT SERPL: 18 (ref 12–20)
CALCIUM SERPL-MCNC: 8.3 MG/DL (ref 8.5–10.1)
CHLORIDE SERPL-SCNC: 109 MMOL/L (ref 97–108)
CO2 SERPL-SCNC: 26 MMOL/L (ref 21–32)
CREAT SERPL-MCNC: 1.08 MG/DL (ref 0.7–1.3)
DIFFERENTIAL METHOD BLD: ABNORMAL
EOSINOPHIL # BLD: 0.2 K/UL (ref 0–0.4)
EOSINOPHIL NFR BLD: 5 % (ref 0–7)
ERYTHROCYTE [DISTWIDTH] IN BLOOD BY AUTOMATED COUNT: 17.7 % (ref 11.5–14.5)
GLOBULIN SER CALC-MCNC: 2.9 G/DL (ref 2–4)
GLUCOSE SERPL-MCNC: 75 MG/DL (ref 65–100)
HCT VFR BLD AUTO: 33.2 % (ref 36.6–50.3)
HGB BLD-MCNC: 9.3 G/DL (ref 12.1–17)
LYMPHOCYTES # BLD AUTO: 25 % (ref 12–49)
LYMPHOCYTES # BLD: 1.2 K/UL (ref 0.8–3.5)
MAGNESIUM SERPL-MCNC: 2.3 MG/DL (ref 1.6–2.4)
MCH RBC QN AUTO: 19.9 PG (ref 26–34)
MCHC RBC AUTO-ENTMCNC: 28 G/DL (ref 30–36.5)
MCV RBC AUTO: 71.1 FL (ref 80–99)
MONOCYTES # BLD: 0.4 K/UL (ref 0–1)
MONOCYTES NFR BLD AUTO: 9 % (ref 5–13)
NEUTS SEG # BLD: 2.9 K/UL (ref 1.8–8)
NEUTS SEG NFR BLD AUTO: 60 % (ref 32–75)
PLATELET # BLD AUTO: 298 K/UL (ref 150–400)
POTASSIUM SERPL-SCNC: 4.1 MMOL/L (ref 3.5–5.1)
PROT SERPL-MCNC: 6.4 G/DL (ref 6.4–8.2)
RBC # BLD AUTO: 4.67 M/UL (ref 4.1–5.7)
RBC MORPH BLD: ABNORMAL
SODIUM SERPL-SCNC: 143 MMOL/L (ref 136–145)
WBC # BLD AUTO: 4.7 K/UL (ref 4.1–11.1)

## 2017-06-16 PROCEDURE — 80053 COMPREHEN METABOLIC PANEL: CPT | Performed by: SURGERY

## 2017-06-16 PROCEDURE — 36415 COLL VENOUS BLD VENIPUNCTURE: CPT | Performed by: SURGERY

## 2017-06-16 PROCEDURE — 83735 ASSAY OF MAGNESIUM: CPT | Performed by: SURGERY

## 2017-06-16 PROCEDURE — 85025 COMPLETE CBC W/AUTO DIFF WBC: CPT | Performed by: SURGERY

## 2017-06-16 NOTE — PERIOP NOTES
Patient given surgical site infection FAQ handout and instruction on use of dial soap. He is allergic to CHG wipes. . Preop instructions reviewed and patient verbalizes understanding of instructions. Patient has been given the opportunity to ask additional questions.

## 2017-06-19 ENCOUNTER — DOCUMENTATION ONLY (OUTPATIENT)
Dept: SURGERY | Age: 72
End: 2017-06-19

## 2017-06-19 NOTE — PROGRESS NOTES
PAT labs reviewed and appears to have chronic anemia.   H+H are stable   Proceed with planned surgery

## 2017-06-19 NOTE — PERIOP NOTES
MESSAGE SENT THROUGH [facility] TO [facility] DR SPAIN'S NURSE FOR ABNORMAL CBC DRAWN IN PAT 6-16-17.

## 2017-06-20 ENCOUNTER — TELEPHONE (OUTPATIENT)
Dept: SURGERY | Age: 72
End: 2017-06-20

## 2017-06-20 NOTE — TELEPHONE ENCOUNTER
Called patient to ask him to arrive at 5:30 for 7:30 surgery time. He's to check with his transportation and let me know before end of day. In the mean time he states he never received his diet instructions or list of foods for post op and would like to pick it up today. He insisted coming in for this so I asked him to come in approx. 2 hrs for the nurse to prepare the list and he'll pick it up in [address]: [address]. He is very anxious.

## 2017-06-20 NOTE — TELEPHONE ENCOUNTER
Diet instructions were mailed to him on 6/14/17. I have reprinted them them for him to  today. Information is at our .

## 2017-06-20 NOTE — TELEPHONE ENCOUNTER
I called the patient and I let him know that Dr Harish Lechuga does not prescribe pain medication pre-op for post op. I told him the hospital has bedside pharmacy and he can get his prescriptions filled before he leaves the hospital and he said he is switching to the earlier surgery time for that day as well. Dr Harish Lechuga made aware.

## 2017-06-23 ENCOUNTER — ANESTHESIA (OUTPATIENT)
Dept: SURGERY | Age: 72
DRG: 328 | End: 2017-06-23
Payer: MEDICARE

## 2017-06-23 ENCOUNTER — HOSPITAL ENCOUNTER (INPATIENT)
Age: 72
LOS: 3 days | Discharge: HOME | DRG: 328 | End: 2017-06-26
Attending: SURGERY | Admitting: SURGERY
Payer: MEDICARE

## 2017-06-23 ENCOUNTER — ANESTHESIA EVENT (OUTPATIENT)
Dept: SURGERY | Age: 72
DRG: 328 | End: 2017-06-23
Payer: MEDICARE

## 2017-06-23 PROCEDURE — 77030020263 HC SOL INJ SOD CL0.9% LFCR 1000ML: Performed by: SURGERY

## 2017-06-23 PROCEDURE — 77030002933 HC SUT MCRYL J&J -A: Performed by: SURGERY

## 2017-06-23 PROCEDURE — 77030016151 HC PROTCTR LNS DFOG COVD -B: Performed by: SURGERY

## 2017-06-23 PROCEDURE — 77030013079 HC BLNKT BAIR HGGR 3M -A: Performed by: ANESTHESIOLOGY

## 2017-06-23 PROCEDURE — 74011250636 HC RX REV CODE- 250/636: Performed by: ANESTHESIOLOGY

## 2017-06-23 PROCEDURE — 77030018846 HC SOL IRR STRL H20 ICUM -A

## 2017-06-23 PROCEDURE — 77030035045 HC TRCR ENDOSC VRSPRT BLDLSS COVD -B: Performed by: SURGERY

## 2017-06-23 PROCEDURE — 76060000036 HC ANESTHESIA 2.5 TO 3 HR: Performed by: SURGERY

## 2017-06-23 PROCEDURE — 88304 TISSUE EXAM BY PATHOLOGIST: CPT | Performed by: SURGERY

## 2017-06-23 PROCEDURE — 77030037032 HC INSRT SCIS CLICKLLINE DISP STOR -B: Performed by: SURGERY

## 2017-06-23 PROCEDURE — 0DV44ZZ RESTRICTION OF ESOPHAGOGASTRIC JUNCTION, PERCUTANEOUS ENDOSCOPIC APPROACH: ICD-10-PCS | Performed by: SURGERY

## 2017-06-23 PROCEDURE — 74011250636 HC RX REV CODE- 250/636

## 2017-06-23 PROCEDURE — 77030020782 HC GWN BAIR PAWS FLX 3M -B

## 2017-06-23 PROCEDURE — 77030019908 HC STETH ESOPH SIMS -A: Performed by: ANESTHESIOLOGY

## 2017-06-23 PROCEDURE — 74011000250 HC RX REV CODE- 250: Performed by: SURGERY

## 2017-06-23 PROCEDURE — 77030009852 HC PCH RTVR ENDOSC COVD -B: Performed by: SURGERY

## 2017-06-23 PROCEDURE — 74011250636 HC RX REV CODE- 250/636: Performed by: SURGERY

## 2017-06-23 PROCEDURE — 77030011640 HC PAD GRND REM COVD -A: Performed by: SURGERY

## 2017-06-23 PROCEDURE — 77030035051: Performed by: SURGERY

## 2017-06-23 PROCEDURE — 77030032490 HC SLV COMPR SCD KNE COVD -B: Performed by: SURGERY

## 2017-06-23 PROCEDURE — C1781 MESH (IMPLANTABLE): HCPCS | Performed by: SURGERY

## 2017-06-23 PROCEDURE — 77030002895 HC DEV VASC CLOSR COVD -B: Performed by: SURGERY

## 2017-06-23 PROCEDURE — 77030014366 HC DRN WND BNTM -A: Performed by: SURGERY

## 2017-06-23 PROCEDURE — 76010000132 HC OR TIME 2.5 TO 3 HR: Performed by: SURGERY

## 2017-06-23 PROCEDURE — 0BUR4JZ SUPPLEMENT R DIAPHRAGM WITH SYNTH SUB, PERC ENDO APPROACH: ICD-10-PCS | Performed by: SURGERY

## 2017-06-23 PROCEDURE — 77030031139 HC SUT VCRL2 J&J -A: Performed by: SURGERY

## 2017-06-23 PROCEDURE — 0FT44ZZ RESECTION OF GALLBLADDER, PERCUTANEOUS ENDOSCOPIC APPROACH: ICD-10-PCS | Performed by: SURGERY

## 2017-06-23 PROCEDURE — 0BUS4JZ SUPPLEMENT LEFT DIAPHRAGM WITH SYNTH SUB, PERC ENDO APPROACH: ICD-10-PCS | Performed by: SURGERY

## 2017-06-23 PROCEDURE — 77030008684 HC TU ET CUF COVD -B: Performed by: ANESTHESIOLOGY

## 2017-06-23 PROCEDURE — 77030020747 HC TU INSUF ENDOSC TELE -A: Performed by: SURGERY

## 2017-06-23 PROCEDURE — P9045 ALBUMIN (HUMAN), 5%, 250 ML: HCPCS

## 2017-06-23 PROCEDURE — 77030012029 HC APPL CLP LIG COVD -C: Performed by: SURGERY

## 2017-06-23 PROCEDURE — 74011000250 HC RX REV CODE- 250: Performed by: ANESTHESIOLOGY

## 2017-06-23 PROCEDURE — 77030018836 HC SOL IRR NACL ICUM -A: Performed by: SURGERY

## 2017-06-23 PROCEDURE — 65270000032 HC RM SEMIPRIVATE

## 2017-06-23 PROCEDURE — 77030034699 HC LIGASURE MRYLND LAP SEAL DIV COVD -F: Performed by: SURGERY

## 2017-06-23 PROCEDURE — 77030026438 HC STYL ET INTUB CARD -A: Performed by: ANESTHESIOLOGY

## 2017-06-23 PROCEDURE — 77030009956 HC RELD ENDOSTCH COVD -B: Performed by: SURGERY

## 2017-06-23 PROCEDURE — 76210000016 HC OR PH I REC 1 TO 1.5 HR: Performed by: SURGERY

## 2017-06-23 PROCEDURE — 77030002967 HC SUT PDS J&J -B: Performed by: SURGERY

## 2017-06-23 PROCEDURE — 77030010507 HC ADH SKN DERMBND J&J -B: Performed by: SURGERY

## 2017-06-23 PROCEDURE — 77030035048 HC TRCR ENDOSC OPTCL COVD -B: Performed by: SURGERY

## 2017-06-23 PROCEDURE — 77030009957 HC RELD ENDOSTCH COVD -C: Performed by: SURGERY

## 2017-06-23 PROCEDURE — 74011000250 HC RX REV CODE- 250

## 2017-06-23 PROCEDURE — 77030008756 HC TU IRR SUC STRY -B: Performed by: SURGERY

## 2017-06-23 DEVICE — MESH HERN W7XL10CM SYN TISS REINF BIOABSRB DISP BIO-A: Type: IMPLANTABLE DEVICE | Site: ESOPHAGUS | Status: FUNCTIONAL

## 2017-06-23 RX ORDER — SODIUM CHLORIDE 0.9 % (FLUSH) 0.9 %
5-10 SYRINGE (ML) INJECTION EVERY 8 HOURS
Status: DISCONTINUED | OUTPATIENT
Start: 2017-06-23 | End: 2017-06-23 | Stop reason: HOSPADM

## 2017-06-23 RX ORDER — FENTANYL CITRATE 50 UG/ML
25 INJECTION, SOLUTION INTRAMUSCULAR; INTRAVENOUS
Status: DISCONTINUED | OUTPATIENT
Start: 2017-06-23 | End: 2017-06-23 | Stop reason: HOSPADM

## 2017-06-23 RX ORDER — FENTANYL CITRATE 50 UG/ML
50 INJECTION, SOLUTION INTRAMUSCULAR; INTRAVENOUS AS NEEDED
Status: DISCONTINUED | OUTPATIENT
Start: 2017-06-23 | End: 2017-06-23 | Stop reason: HOSPADM

## 2017-06-23 RX ORDER — PROPOFOL 10 MG/ML
INJECTION, EMULSION INTRAVENOUS AS NEEDED
Status: DISCONTINUED | OUTPATIENT
Start: 2017-06-23 | End: 2017-06-23 | Stop reason: HOSPADM

## 2017-06-23 RX ORDER — FENTANYL CITRATE 50 UG/ML
INJECTION, SOLUTION INTRAMUSCULAR; INTRAVENOUS AS NEEDED
Status: DISCONTINUED | OUTPATIENT
Start: 2017-06-23 | End: 2017-06-23 | Stop reason: HOSPADM

## 2017-06-23 RX ORDER — DEXAMETHASONE SODIUM PHOSPHATE 4 MG/ML
INJECTION, SOLUTION INTRA-ARTICULAR; INTRALESIONAL; INTRAMUSCULAR; INTRAVENOUS; SOFT TISSUE AS NEEDED
Status: DISCONTINUED | OUTPATIENT
Start: 2017-06-23 | End: 2017-06-23 | Stop reason: HOSPADM

## 2017-06-23 RX ORDER — LIDOCAINE HYDROCHLORIDE 10 MG/ML
0.1 INJECTION, SOLUTION EPIDURAL; INFILTRATION; INTRACAUDAL; PERINEURAL AS NEEDED
Status: DISCONTINUED | OUTPATIENT
Start: 2017-06-23 | End: 2017-06-23 | Stop reason: HOSPADM

## 2017-06-23 RX ORDER — SODIUM CHLORIDE 9 MG/ML
50 INJECTION, SOLUTION INTRAVENOUS CONTINUOUS
Status: DISCONTINUED | OUTPATIENT
Start: 2017-06-23 | End: 2017-06-23 | Stop reason: HOSPADM

## 2017-06-23 RX ORDER — PROCHLORPERAZINE EDISYLATE 5 MG/ML
5 INJECTION INTRAMUSCULAR; INTRAVENOUS
Status: DISCONTINUED | OUTPATIENT
Start: 2017-06-23 | End: 2017-06-23 | Stop reason: SDUPTHER

## 2017-06-23 RX ORDER — ONDANSETRON 4 MG/1
4 TABLET, ORALLY DISINTEGRATING ORAL
Qty: 10 TAB | Refills: 1 | Status: SHIPPED | OUTPATIENT
Start: 2017-06-23 | End: 2017-07-10 | Stop reason: ALTCHOICE

## 2017-06-23 RX ORDER — SODIUM CHLORIDE 0.9 % (FLUSH) 0.9 %
5-10 SYRINGE (ML) INJECTION EVERY 8 HOURS
Status: DISCONTINUED | OUTPATIENT
Start: 2017-06-23 | End: 2017-06-26 | Stop reason: HOSPADM

## 2017-06-23 RX ORDER — MIDAZOLAM HYDROCHLORIDE 1 MG/ML
INJECTION, SOLUTION INTRAMUSCULAR; INTRAVENOUS AS NEEDED
Status: DISCONTINUED | OUTPATIENT
Start: 2017-06-23 | End: 2017-06-23 | Stop reason: HOSPADM

## 2017-06-23 RX ORDER — MIDAZOLAM HYDROCHLORIDE 1 MG/ML
0.5 INJECTION, SOLUTION INTRAMUSCULAR; INTRAVENOUS
Status: DISCONTINUED | OUTPATIENT
Start: 2017-06-23 | End: 2017-06-23 | Stop reason: HOSPADM

## 2017-06-23 RX ORDER — ATROPINE SULFATE 0.4 MG/ML
INJECTION, SOLUTION ENDOTRACHEAL; INTRAMEDULLARY; INTRAMUSCULAR; INTRAVENOUS; SUBCUTANEOUS AS NEEDED
Status: DISCONTINUED | OUTPATIENT
Start: 2017-06-23 | End: 2017-06-23 | Stop reason: HOSPADM

## 2017-06-23 RX ORDER — LIDOCAINE HYDROCHLORIDE 20 MG/ML
INJECTION, SOLUTION EPIDURAL; INFILTRATION; INTRACAUDAL; PERINEURAL AS NEEDED
Status: DISCONTINUED | OUTPATIENT
Start: 2017-06-23 | End: 2017-06-23 | Stop reason: HOSPADM

## 2017-06-23 RX ORDER — OXYCODONE AND ACETAMINOPHEN 5; 325 MG/1; MG/1
1 TABLET ORAL AS NEEDED
Status: DISCONTINUED | OUTPATIENT
Start: 2017-06-23 | End: 2017-06-23 | Stop reason: HOSPADM

## 2017-06-23 RX ORDER — SODIUM CHLORIDE, SODIUM LACTATE, POTASSIUM CHLORIDE, CALCIUM CHLORIDE 600; 310; 30; 20 MG/100ML; MG/100ML; MG/100ML; MG/100ML
75 INJECTION, SOLUTION INTRAVENOUS CONTINUOUS
Status: DISCONTINUED | OUTPATIENT
Start: 2017-06-23 | End: 2017-06-23 | Stop reason: HOSPADM

## 2017-06-23 RX ORDER — ONDANSETRON 2 MG/ML
4 INJECTION INTRAMUSCULAR; INTRAVENOUS AS NEEDED
Status: DISCONTINUED | OUTPATIENT
Start: 2017-06-23 | End: 2017-06-23 | Stop reason: HOSPADM

## 2017-06-23 RX ORDER — PHENYLEPHRINE HCL IN 0.9% NACL 0.4MG/10ML
SYRINGE (ML) INTRAVENOUS AS NEEDED
Status: DISCONTINUED | OUTPATIENT
Start: 2017-06-23 | End: 2017-06-23 | Stop reason: HOSPADM

## 2017-06-23 RX ORDER — HYDROMORPHONE HYDROCHLORIDE 1 MG/ML
0.2 INJECTION, SOLUTION INTRAMUSCULAR; INTRAVENOUS; SUBCUTANEOUS
Status: DISCONTINUED | OUTPATIENT
Start: 2017-06-23 | End: 2017-06-23 | Stop reason: HOSPADM

## 2017-06-23 RX ORDER — MORPHINE SULFATE 10 MG/ML
2 INJECTION, SOLUTION INTRAMUSCULAR; INTRAVENOUS
Status: DISCONTINUED | OUTPATIENT
Start: 2017-06-23 | End: 2017-06-23 | Stop reason: HOSPADM

## 2017-06-23 RX ORDER — ENOXAPARIN SODIUM 100 MG/ML
40 INJECTION SUBCUTANEOUS EVERY 24 HOURS
Status: DISCONTINUED | OUTPATIENT
Start: 2017-06-24 | End: 2017-06-26 | Stop reason: HOSPADM

## 2017-06-23 RX ORDER — ALBUMIN HUMAN 50 G/1000ML
SOLUTION INTRAVENOUS AS NEEDED
Status: DISCONTINUED | OUTPATIENT
Start: 2017-06-23 | End: 2017-06-23 | Stop reason: HOSPADM

## 2017-06-23 RX ORDER — SODIUM CHLORIDE, SODIUM LACTATE, POTASSIUM CHLORIDE, CALCIUM CHLORIDE 600; 310; 30; 20 MG/100ML; MG/100ML; MG/100ML; MG/100ML
100 INJECTION, SOLUTION INTRAVENOUS CONTINUOUS
Status: DISPENSED | OUTPATIENT
Start: 2017-06-23 | End: 2017-06-24

## 2017-06-23 RX ORDER — HYDROCODONE BITARTRATE AND ACETAMINOPHEN 5; 325 MG/1; MG/1
1 TABLET ORAL
Qty: 30 TAB | Refills: 0 | Status: SHIPPED | OUTPATIENT
Start: 2017-06-23 | End: 2017-07-10

## 2017-06-23 RX ORDER — ONDANSETRON 2 MG/ML
INJECTION INTRAMUSCULAR; INTRAVENOUS AS NEEDED
Status: DISCONTINUED | OUTPATIENT
Start: 2017-06-23 | End: 2017-06-23 | Stop reason: HOSPADM

## 2017-06-23 RX ORDER — ROCURONIUM BROMIDE 10 MG/ML
INJECTION, SOLUTION INTRAVENOUS AS NEEDED
Status: DISCONTINUED | OUTPATIENT
Start: 2017-06-23 | End: 2017-06-23 | Stop reason: HOSPADM

## 2017-06-23 RX ORDER — MIDAZOLAM HYDROCHLORIDE 1 MG/ML
1 INJECTION, SOLUTION INTRAMUSCULAR; INTRAVENOUS AS NEEDED
Status: DISCONTINUED | OUTPATIENT
Start: 2017-06-23 | End: 2017-06-23 | Stop reason: HOSPADM

## 2017-06-23 RX ORDER — SODIUM CHLORIDE 0.9 % (FLUSH) 0.9 %
5-10 SYRINGE (ML) INJECTION AS NEEDED
Status: DISCONTINUED | OUTPATIENT
Start: 2017-06-23 | End: 2017-06-23 | Stop reason: HOSPADM

## 2017-06-23 RX ORDER — SUCCINYLCHOLINE CHLORIDE 20 MG/ML
INJECTION INTRAMUSCULAR; INTRAVENOUS AS NEEDED
Status: DISCONTINUED | OUTPATIENT
Start: 2017-06-23 | End: 2017-06-23 | Stop reason: HOSPADM

## 2017-06-23 RX ORDER — CEFAZOLIN SODIUM IN 0.9 % NACL 2 G/100 ML
PLASTIC BAG, INJECTION (ML) INTRAVENOUS AS NEEDED
Status: DISCONTINUED | OUTPATIENT
Start: 2017-06-23 | End: 2017-06-23 | Stop reason: HOSPADM

## 2017-06-23 RX ORDER — SODIUM CHLORIDE 0.9 % (FLUSH) 0.9 %
5-10 SYRINGE (ML) INJECTION AS NEEDED
Status: DISCONTINUED | OUTPATIENT
Start: 2017-06-23 | End: 2017-06-26 | Stop reason: HOSPADM

## 2017-06-23 RX ORDER — BUPIVACAINE HYDROCHLORIDE 5 MG/ML
50 INJECTION, SOLUTION EPIDURAL; INTRACAUDAL ONCE
Status: COMPLETED | OUTPATIENT
Start: 2017-06-23 | End: 2017-06-23

## 2017-06-23 RX ORDER — MORPHINE SULFATE 2 MG/ML
1 INJECTION, SOLUTION INTRAMUSCULAR; INTRAVENOUS
Status: DISCONTINUED | OUTPATIENT
Start: 2017-06-23 | End: 2017-06-26 | Stop reason: HOSPADM

## 2017-06-23 RX ORDER — DIPHENHYDRAMINE HYDROCHLORIDE 50 MG/ML
12.5 INJECTION, SOLUTION INTRAMUSCULAR; INTRAVENOUS AS NEEDED
Status: DISCONTINUED | OUTPATIENT
Start: 2017-06-23 | End: 2017-06-23 | Stop reason: HOSPADM

## 2017-06-23 RX ADMIN — HYDROMORPHONE HYDROCHLORIDE 0.2 MG: 1 INJECTION, SOLUTION INTRAMUSCULAR; INTRAVENOUS; SUBCUTANEOUS at 11:10

## 2017-06-23 RX ADMIN — ALBUMIN HUMAN 250 ML: 50 SOLUTION INTRAVENOUS at 08:57

## 2017-06-23 RX ADMIN — ROCURONIUM BROMIDE 10 MG: 10 INJECTION, SOLUTION INTRAVENOUS at 09:11

## 2017-06-23 RX ADMIN — FENTANYL CITRATE 25 MCG: 50 INJECTION, SOLUTION INTRAMUSCULAR; INTRAVENOUS at 11:05

## 2017-06-23 RX ADMIN — Medication 2 G: at 07:51

## 2017-06-23 RX ADMIN — SODIUM CHLORIDE, SODIUM LACTATE, POTASSIUM CHLORIDE, AND CALCIUM CHLORIDE 100 ML/HR: 600; 310; 30; 20 INJECTION, SOLUTION INTRAVENOUS at 18:28

## 2017-06-23 RX ADMIN — Medication 1 MG: at 15:50

## 2017-06-23 RX ADMIN — ONDANSETRON 4 MG: 2 INJECTION INTRAMUSCULAR; INTRAVENOUS at 09:51

## 2017-06-23 RX ADMIN — LIDOCAINE HYDROCHLORIDE 0.1 ML: 10 INJECTION, SOLUTION EPIDURAL; INFILTRATION; INTRACAUDAL; PERINEURAL at 06:42

## 2017-06-23 RX ADMIN — ROCURONIUM BROMIDE 10 MG: 10 INJECTION, SOLUTION INTRAVENOUS at 08:45

## 2017-06-23 RX ADMIN — Medication 80 MCG: at 08:53

## 2017-06-23 RX ADMIN — SODIUM CHLORIDE, SODIUM LACTATE, POTASSIUM CHLORIDE, AND CALCIUM CHLORIDE: 600; 310; 30; 20 INJECTION, SOLUTION INTRAVENOUS at 09:45

## 2017-06-23 RX ADMIN — FENTANYL CITRATE 25 MCG: 50 INJECTION, SOLUTION INTRAMUSCULAR; INTRAVENOUS at 11:20

## 2017-06-23 RX ADMIN — PROPOFOL 150 MG: 10 INJECTION, EMULSION INTRAVENOUS at 07:35

## 2017-06-23 RX ADMIN — Medication 80 MCG: at 08:20

## 2017-06-23 RX ADMIN — ROCURONIUM BROMIDE 20 MG: 10 INJECTION, SOLUTION INTRAVENOUS at 07:56

## 2017-06-23 RX ADMIN — LIDOCAINE HYDROCHLORIDE 60 MG: 20 INJECTION, SOLUTION EPIDURAL; INFILTRATION; INTRACAUDAL; PERINEURAL at 07:35

## 2017-06-23 RX ADMIN — DEXAMETHASONE SODIUM PHOSPHATE 4 MG: 4 INJECTION, SOLUTION INTRA-ARTICULAR; INTRALESIONAL; INTRAMUSCULAR; INTRAVENOUS; SOFT TISSUE at 09:51

## 2017-06-23 RX ADMIN — HYDROMORPHONE HYDROCHLORIDE 0.2 MG: 1 INJECTION, SOLUTION INTRAMUSCULAR; INTRAVENOUS; SUBCUTANEOUS at 11:25

## 2017-06-23 RX ADMIN — SODIUM CHLORIDE, SODIUM LACTATE, POTASSIUM CHLORIDE, AND CALCIUM CHLORIDE 75 ML/HR: 600; 310; 30; 20 INJECTION, SOLUTION INTRAVENOUS at 06:42

## 2017-06-23 RX ADMIN — HYDROMORPHONE HYDROCHLORIDE 0.2 MG: 1 INJECTION, SOLUTION INTRAMUSCULAR; INTRAVENOUS; SUBCUTANEOUS at 11:40

## 2017-06-23 RX ADMIN — FENTANYL CITRATE 25 MCG: 50 INJECTION, SOLUTION INTRAMUSCULAR; INTRAVENOUS at 11:00

## 2017-06-23 RX ADMIN — ROCURONIUM BROMIDE 10 MG: 10 INJECTION, SOLUTION INTRAVENOUS at 07:35

## 2017-06-23 RX ADMIN — MIDAZOLAM HYDROCHLORIDE 2 MG: 1 INJECTION, SOLUTION INTRAMUSCULAR; INTRAVENOUS at 07:29

## 2017-06-23 RX ADMIN — ATROPINE SULFATE 0.8 MG: 0.4 INJECTION, SOLUTION ENDOTRACHEAL; INTRAMEDULLARY; INTRAMUSCULAR; INTRAVENOUS; SUBCUTANEOUS at 07:35

## 2017-06-23 RX ADMIN — SUCCINYLCHOLINE CHLORIDE 160 MG: 20 INJECTION INTRAMUSCULAR; INTRAVENOUS at 07:35

## 2017-06-23 RX ADMIN — FENTANYL CITRATE 50 MCG: 50 INJECTION, SOLUTION INTRAMUSCULAR; INTRAVENOUS at 09:52

## 2017-06-23 RX ADMIN — Medication 1 MG: at 22:11

## 2017-06-23 RX ADMIN — Medication 80 MCG: at 07:50

## 2017-06-23 RX ADMIN — Medication 1 MG: at 18:36

## 2017-06-23 RX ADMIN — Medication 1 MG: at 13:24

## 2017-06-23 RX ADMIN — FENTANYL CITRATE 50 MCG: 50 INJECTION, SOLUTION INTRAMUSCULAR; INTRAVENOUS at 07:35

## 2017-06-23 RX ADMIN — Medication 10 ML: at 15:50

## 2017-06-23 NOTE — PERIOP NOTES
TRANSFER - OUT REPORT:    Verbal report given to Hans Hartmann on Leonardo Kraft Six  being transferred to room 508 for routine post - op       Report consisted of patients Situation, Background, Assessment and   Recommendations(SBAR). Time Pre op antibiotic given:  2437  Anesthesia Stop time:  0910  Hinojosa Present on Transfer to floor: NO  Order for Hinojosa on Chart:  NO    Information from the following report(s) SBAR and MAR was reviewed with the receiving nurse. Opportunity for questions and clarification was provided. Is the patient on 02? YES       L/Min 2       Other     Is the patient on a monitor? NO    Is the nurse transporting with the patient? NO    Surgical Waiting Area notified of patient's transfer from PACU? YES    Lines:   Peripheral IV 06/23/17 Left Forearm (Active)   Site Assessment Clean, dry, & intact 6/23/2017 10:20 AM   Phlebitis Assessment 0 6/23/2017 11:15 AM   Infiltration Assessment 0 6/23/2017 11:15 AM   Dressing Status Clean, dry, & intact 6/23/2017 10:20 AM   Dressing Type Transparent 6/23/2017 10:20 AM   Hub Color/Line Status Infusing 6/23/2017 11:15 AM        The following personal items collected during your admission accompanied patient upon transfer:   Dental Appliance: Dental Appliances: None  Vision: Visual Aid: Glasses, At home  Hearing Aid:    Jewelry: Jewelry: None  Clothing: Clothing: Footwear, Pants, Undergarments, Socks, Shirt (sent to [employer])  Other Valuables:  Other Valuables: Cell Phone, Shelvy Riddles (sent to security)  Valuables sent to safe:

## 2017-06-23 NOTE — DISCHARGE INSTRUCTIONS
DISCHARGE SUMMARY from Nurse    The following personal items are in your possession at time of discharge:    Dental Appliances: None  Visual Aid: Glasses, With patient     Home Medications: None  Jewelry: None  Clothing: At bedside  Other Valuables: Cell Phone, Wallet             PATIENT INSTRUCTIONS:    After general anesthesia or intravenous sedation, for 24 hours or while taking prescription Narcotics:  · Limit your activities  · Do not drive and operate hazardous machinery  · Do not make important personal or business decisions  · Do  not drink alcoholic beverages  · If you have not urinated within 8 hours after discharge, please contact your surgeon on call. Report the following to your surgeon:  · Excessive pain, swelling, redness or odor of or around the surgical area  · Temperature over 100.5  · Nausea and vomiting lasting longer than 4 hours or if unable to take medications  · Any signs of decreased circulation or nerve impairment to extremity: change in color, persistent  numbness, tingling, coldness or increase pain  · Any questions        What to do at Home:  Recommended activity: per instructions  If you experience any of the following symptoms per instructions, please follow up with per instructions. *  Please give a list of your current medications to your Primary Care Provider. *  Please update this list whenever your medications are discontinued, doses are      changed, or new medications (including over-the-counter products) are added. *  Please carry medication information at all times in case of emergency situations. These are general instructions for a healthy lifestyle:    No smoking/ No tobacco products/ Avoid exposure to second hand smoke    Surgeon General's Warning:  Quitting smoking now greatly reduces serious risk to your health.     Obesity, smoking, and sedentary lifestyle greatly increases your risk for illness    A healthy diet, regular physical exercise & weight monitoring are important for maintaining a healthy lifestyle    You may be retaining fluid if you have a history of heart failure or if you experience any of the following symptoms:  Weight gain of 3 pounds or more overnight or 5 pounds in a week, increased swelling in our hands or feet or shortness of breath while lying flat in bed. Please call your doctor as soon as you notice any of these symptoms; do not wait until your next office visit. Recognize signs and symptoms of STROKE:    F-face looks uneven    A-arms unable to move or move unevenly    S-speech slurred or non-existent    T-time-call 911 as soon as signs and symptoms begin-DO NOT go       Back to bed or wait to see if you get better-TIME IS BRAIN. Warning Signs of HEART ATTACK     Call 911 if you have these symptoms:   Chest discomfort. Most heart attacks involve discomfort in the center of the chest that lasts more than a few minutes, or that goes away and comes back. It can feel like uncomfortable pressure, squeezing, fullness, or pain.  Discomfort in other areas of the upper body. Symptoms can include pain or discomfort in one or both arms, the back, neck, jaw, or stomach.  Shortness of breath with or without chest discomfort.  Other signs may include breaking out in a cold sweat, nausea, or lightheadedness. Don't wait more than five minutes to call 911 - MINUTES MATTER! Fast action can save your life. Calling 911 is almost always the fastest way to get lifesaving treatment. Emergency Medical Services staff can begin treatment when they arrive -- up to an hour sooner than if someone gets to the hospital by car. The discharge information has been reviewed with the patient. The patient verbalized understanding. Discharge medications reviewed with the patient and appropriate educational materials and side effects teaching were provided. Call [phone] to schedule post-op appointment for week of 7/10.      Nissen Fundoplication: What to Expect at [address] [place] [address] may be sore and have some pain in your belly for several weeks after surgery. If you had laparoscopic surgery, you also may have pain near your shoulder for a day or two after surgery. It may be hard for you to swallow for up to 6 weeks after the surgery. You may also have cramping in your belly, feel bloated, or pass more gas than before. When you burp, you may not get as much relief as you did before the surgery. The cramping and bloating usually go away in 2 to 3 months, but you may continue to pass more gas for a long time. Because the surgery makes your stomach a little smaller, you may get full more quickly when you eat. In 2 to 3 months, the stomach adjusts and you will be able to eat your usual amounts of food. How quickly you recover depends on whether you had a laparoscopic or open surgery. After laparoscopic surgery, most people can go back to work or their normal routine in about 2 to 3 weeks, depending on their work. After open surgery, you may need 4 to 6 weeks to get back to your normal routine. This care sheet gives you a general idea about how long it will take for you to recover. But each person recovers at a different pace. Follow the steps below to get better as quickly as possible. How can you care for yourself at home? Activity  · Rest when you feel tired. Getting enough sleep will help you recover. · Try to walk each day. Start out by walking a little more than you did the day before. Bit by bit, increase the amount you walk. Walking boosts blood flow and helps prevent pneumonia and constipation. · For about 2 weeks, or 4 to 6 weeks if you had an open surgery, avoid lifting objects heavier than about 15 to 20 pounds. This may include heavy grocery bags and milk containers, a heavy briefcase or backpack, cat litter or dog food bags, a vacuum , or a child.   · Do not do sit-ups or any exercise or activity that uses your belly muscles. · You can be active and do things around the house as you can tolerate it. Do not take part in any activity where you could be hit in the belly. This could be sports or playing with children. · You may shower. Pat your incisions dry. If you had an open surgery, you need to keep the incision dry until it begins to heal. Do not take baths until your doctor says it is okay. · Ask your doctor when you can drive again. · Ask your doctor when it is okay for you to have sex. Diet  · For the first weekend, stay on a liquid diet. This includes broths, soups, milk shakes. On Monday, start pureed diet (yogurt, pudding, mashed potatoes, etc). Stay on this diet until first post-op appointment. · Have 5 or 6 small meals each day instead of 2 or 3 large meals. · If you feel full quickly, try to drink fluids between meals instead of with meals. · Avoid carbonated beverages, such as soda pop. · Avoid drinking with straws. This may help you swallow less air when you drink. · You may notice that your bowel movements are not regular right after your surgery. This is common. Try to avoid constipation and straining with bowel movements. Take a fiber supplement every day. If you have not had a bowel movement after a couple of days, ask your doctor about taking a mild laxative. Medicines  · Your doctor will tell you if and when you can restart your medicines. He or she will also give you instructions about taking any new medicines. · If you take blood thinners, such as warfarin (Coumadin), clopidogrel (Plavix), or aspirin, be sure to talk to your doctor. He or she will tell you if and when to start taking those medicines again. Make sure that you understand exactly what your doctor wants you to do. · Take pain medicines exactly as directed. ¨ If the doctor gave you a prescription medicine for pain, take it as prescribed.   ¨ If you are not taking a prescription pain medicine, ask your doctor if you can take an over-the-counter medicine. · If you think your pain medicine is making you sick to your stomach:  ¨ Take your medicine after meals (unless your doctor tells you not to). ¨ Ask your doctor for a different pain medicine. · If your doctor prescribed antibiotics, take them as directed. Do not stop taking them just because you feel better. You need to take the full course of antibiotics. · Continue to take your acid-reducing medicine for 1 month after surgery or as your doctor tells you. Incision care  · If you have strips of tape on the cuts the doctor made (incisions), leave the tape on for a week or until it falls off. · Wash the area daily with warm, soapy water and pat it dry. Don't use hydrogen peroxide or alcohol, which can slow healing. You may cover the area with a gauze bandage if it weeps or rubs against clothing. Change the bandage every day. · Keep the area clean and dry. Follow-up care is a key part of your treatment and safety. Be sure to make and go to all appointments, and call your doctor if you are having problems. It's also a good idea to know your test results and keep a list of the medicines you take. When should you call for help? Call 911 anytime you think you may need emergency care. For example, call if:  · You passed out (lost consciousness). · You have severe trouble breathing. · You have sudden chest pain and shortness of breath, or you cough up blood. · You have severe pain in your belly or chest.  Call your doctor now or seek immediate medical care if:  · You are sick to your stomach or cannot keep fluids down. · You have pain that does not get better after you take pain medicine. · You have signs of infection, such as:  ¨ Increased pain, swelling, warmth, or redness. ¨ Red streaks leading from the incision. ¨ Pus draining from the incision. ¨ A fever. · You have loose stitches, or your incision comes open.   · You have signs of a blood clot, such as:  ¨ Pain in your calf, back of the knee, thigh, or groin. ¨ Redness and swelling in your leg or groin. · You have trouble passing urine or stool, especially if you have pain or swelling in your lower belly. Watch closely for any changes in your health, and be sure to contact your doctor if:  · You have a cough that does not go away or one that brings up mucus. · You have shoulder pain that lasts more than 3 days. · You do not have a bowel movement after taking a laxative. Where can you learn more? Go to http://kallie-audrey.info/. Enter Z072 in the search box to learn more about \"Nissen Fundoplication: What to Expect at Home. \"  Current as of: August 9, 2016  Content Version: 11.3  © 4472-5857 [employer]. Care instructions adapted under license by [employer] (which disclaims liability or warranty for this information). If you have questions about a medical condition or this instruction, always ask your healthcare professional. [place] [address] any warranty or liability for your use of this information.

## 2017-06-23 NOTE — ANESTHESIA POSTPROCEDURE EVALUATION
Post-Anesthesia Evaluation and Assessment    Patient: Nivia Vann MRN: [id number]  SSN: [id number]    YOB: 1945  Age: 71 y.o. Sex: male       Cardiovascular Function/Vital Signs  Visit Vitals    /66    Pulse 73    Temp 36.4 °C (97.6 °F)    Resp 21    Ht 5' 10\" (1.778 m)    Wt 78.5 kg (173 lb)    SpO2 94%    BMI 24.82 kg/m2       Patient is status post No value filed. anesthesia for Procedure(s):  LAPAROSCOPIC PARAESOPHAGEAL HERNIA REPAIR WITH MESH  CHOLECYSTECTOMY LAPAROSCOPIC. Nausea/Vomiting: None    Postoperative hydration reviewed and adequate. Pain:  Pain Scale 1: Numeric (0 - 10) (06/23/17 2225)  Pain Intensity 1: 0 (06/23/17 1336)   Managed    Neurological Status:   Neuro (WDL): Within Defined Limits (06/23/17 0630)   At baseline    Mental Status and Level of Consciousness: Arousable    Pulmonary Status:   O2 Device: Nasal cannula (06/23/17 1019)   Adequate oxygenation and airway patent    Complications related to anesthesia: None    Post-anesthesia assessment completed.  No concerns    Signed By: Blythe Sicard, DO     June 23, 2017

## 2017-06-23 NOTE — ANESTHESIA PREPROCEDURE EVALUATION
Anesthetic History   No history of anesthetic complications            Review of Systems / Medical History  Patient summary reviewed, nursing notes reviewed and pertinent labs reviewed    Pulmonary            Asthma        Neuro/Psych             Comments: CHRONIC FATIGUE Cardiovascular            Dysrhythmias       Exercise tolerance: >4 METS  Comments: SSS PT STOPPED GOING TO CARDIOLOGIST   GI/Hepatic/Renal     GERD: well controlled          Comments: Biliary dyskinesia      Paraesophageal hernia     Endo/Other        Blood dyscrasia, arthritis and anemia     Other Findings            Physical Exam    Airway  Mallampati: II  TM Distance: 4 - 6 cm  Neck ROM: normal range of motion   Mouth opening: Normal     Cardiovascular  Regular rate and rhythm,  S1 and S2 normal,  no murmur, click, rub, or gallop  Rhythm: regular  Rate: normal         Dental  No notable dental hx       Pulmonary  Breath sounds clear to auscultation               Abdominal  GI exam deferred       Other Findings            Anesthetic Plan    ASA: 3  Anesthesia type: general          Induction: Intravenous and RSI  Anesthetic plan and risks discussed with: Patient      Ephedrine/atropine perioperatively, discussed SSS with patient

## 2017-06-23 NOTE — IP AVS SNAPSHOT
Current Discharge Medication List  
  
START taking these medications Dose & Instructions Dispensing Information Comments Morning Noon Evening Bedtime HYDROcodone-acetaminophen 5-325 mg per tablet Commonly known as:  Mata Tellez Your last dose was: Your next dose is:    
   
   
 Dose:  1 Tab Take 1 Tab by mouth every four (4) hours as needed for Pain. Max Daily Amount: 6 Tabs. Quantity:  30 Tab Refills:  0  
     
   
   
   
  
 ondansetron 4 mg disintegrating tablet Commonly known as:  ZOFRAN ODT Your last dose was: Your next dose is:    
   
   
 Dose:  4 mg Take 1 Tab by mouth every eight (8) hours as needed for Nausea. Quantity:  10 Tab Refills:  1 CONTINUE these medications which have NOT CHANGED Dose & Instructions Dispensing Information Comments Morning Noon Evening Bedtime  
 aspirin delayed-release 81 mg tablet Your last dose was: Your next dose is:    
   
   
 Dose:  81 mg Take 1 Tab by mouth daily. Quantity:  30 Tab Refills:  1  
     
   
   
   
  
 calcium carbonate 200 mg calcium (500 mg) Chew Commonly known as:  TUMS Your last dose was: Your next dose is:    
   
   
 Dose:  1 Tab Take 1 Tab by mouth as needed. Refills:  0 Where to Get Your Medications Information on where to get these meds will be given to you by the nurse or doctor. ! Ask your nurse or doctor about these medications HYDROcodone-acetaminophen 5-325 mg per tablet  
 ondansetron 4 mg disintegrating tablet

## 2017-06-23 NOTE — H&P
Subjective:   Yi Banuelos is a 71 y.o. male with abdominal pain. The pain is located in the RUQ without radiation. Pain is described as aching and measures 6/10 in intensity. Onset of pain was several months ago following right rotator cuff repair. Aggravating factors include eating. Alleviating factors include gluten-free diet. Associated symptoms include nausea and GERD symptoms. He denies emesis, fever, chills, change in bowel habits, dysphagia, or chest pain.  Recent UGI series reveals a large paraesophageal hernia.          Patient Active Problem List     Diagnosis Date Noted    Biliary dyskinesia 06/06/2017    Paraesophageal hernia 06/06/2017    Fatigue 07/30/2015    Bradycardia 07/30/2015    [facility] 07/30/2015    Abnormal EKG 06/24/2015    GERD (gastroesophageal reflux disease) 01/31/2015    [facility] 07/23/2013           Past Medical History:   Diagnosis Date    Adverse effect of anesthesia       PT STATES \" I HAD VERY LOW BLOOD PRESSURE FOR A FEW WEEKS FOLLOWING SINUS SURGERY\"    Arrhythmia       OF UNKNOWN TYPE PER PATIENT, PT STOPPED GOING TO CARDIOLOGIST    Arthritis      Asthma       AS A CHILD    CFS (chronic fatigue syndrome)      Contact dermatitis and other eczema, due to unspecified cause      GERD (gastroesophageal reflux disease)      Hiatal hernia      Rotator cuff disorder       right      Past Surgical History:   Procedure Laterality Date    ABDOMEN SURGERY PROC UNLISTED         HERNIA REPAIR    HX APPENDECTOMY        HX COLONOSCOPY        HX HEENT   07/2014     SINUS SURGERY- \"CLEANING OUT\" INFECTION OF SINUSES    HX ORTHOPAEDIC Right 1988     SHOULDER REPAIR ( PT DOESN'T KNOW)     HX ORTHOPAEDIC         AT AGE 19 , BONE SPURS/NODULE REMOVED NEAR LEFT FEMUR    HX RETINAL DETACHMENT REPAIR         LEFT EYE    HX TONSILLECTOMY               Social History   Substance Use Topics    Smoking status: Never Smoker  Smokeless tobacco: Never Used    Alcohol use No             Family History   Problem Relation Age of Onset    Hypertension Mother      Stroke Mother      Diabetes Mother      Heart Disease Mother      Cancer Father         ABDOMINAL CANCER    Anesth Problems Neg Hx             Current Outpatient Prescriptions   Medication Sig    aspirin delayed-release 81 mg tablet Take 1 Tab by mouth daily.  cephALEXin (KEFLEX) 250 mg capsule Take 1 Cap by mouth three (3) times daily.  acetaminophen-codeine (TYLENOL #3) 300-30 mg per tablet Take 1 Tab by mouth every four (4) hours as needed for Pain. Max Daily Amount: 6 Tabs.  ondansetron (ZOFRAN ODT) 4 mg disintegrating tablet Take 1 Tab by mouth every eight (8) hours as needed for Nausea.  oxyCODONE-acetaminophen (PERCOCET) 5-325 mg per tablet Take 1-2 Tabs by mouth every four (4) hours as needed for Pain. Max Daily Amount: 12 Tabs.  calcium carbonate (TUMS) 200 mg calcium (500 mg) chew Take 1 Tab by mouth as needed.      No current facility-administered medications for this visit. Allergies   Allergen Reactions    Latex Itching       AND HIVES    Sulfa (Sulfonamide Antibiotics) Anaphylaxis    Broccoli Nausea and Vomiting    Chlorhexidine Rash    Diltiazem Nausea Only    Gluten Other (comments)       \" WANTS TO SLEEP 24 HOURS A DAY , AND IT AFFECTS MY HEART\", PT CAN'T ELABORATE.      Ibuprofen Nausea and Vomiting    Lactose Other (comments)       EXTREME INDIGESTION    Propafenone Other (comments)       Nausea and pain    Shellfish Derived Other (comments)       STOMACH UPSET    Tapioca Nausea and Vomiting    Wheat Other (comments)       \" WANTS TO SLEEP 24 HOURS A DAY\"         Review of Systems:    A complete review of systems was negative except as noted in the HPI.     Objective:          Visit Vitals    /71 (BP 1 Location: Left arm, BP Patient Position: At rest)    Pulse 69    Temp 97.9 °F (36.6 °C)    Resp 18    Ht 5' 10\" (1.778 m)    Wt 173 lb (78.5 kg)    SpO2 99%    BMI 24.82 kg/m2       Physical Exam:  GENERAL: alert, cooperative, no distress, appears stated age, EYE: negative, THROAT & NECK: normal, LUNG: clear to auscultation bilaterally, HEART: regular rate and rhythm, S1, S2 normal, no murmur. ABDOMEN: soft, non-distended. Bowel sounds normal. Focal right-sided pain with palpation. No masses,  no organomegaly, no hernia. EXTREMITIES:  extremities normal, atraumatic, no cyanosis or edema, SKIN: Normal., NEUROLOGIC: negative.     Imaging:  reviewed  Ultrasound and HIDA, endoscopy     Lab/Data Review:  Endoscopy: Paraesophageal hernia; scope could not be passed into antrum/duodenum        Assessment:      Biliary dyskinesia: symptom reproduction with CCK at time of HIDA     Type III paraesophageal hernia, symptomatic     Plan:      1. I recommend proceeding with laparoscopic paraesophageal hernia repair with mesh with cholecystectomy and upper endoscopy. Risks reviewed to include bleeding, infection, conversion to open procedure, recurrent hernia, bile duct injury, dysphagia, persistent symptoms, and visceral injury. 2. He agrees with this plan and desires to proceed with surgery.

## 2017-06-23 NOTE — BRIEF OP NOTE
BRIEF OPERATIVE NOTE    Date of Procedure: 6/23/2017   Preoperative Diagnosis: PARAESOPHAGEAL HERNIA, BILIARY DYSKINESIA  Postoperative Diagnosis: PARAESOPHAGEAL HERNIA, BILIARY DYSKINESIA    Procedure(s):  LAPAROSCOPIC PARAESOPHAGEAL HERNIA REPAIR WITH MESH, LAPAROSCOPIC CHOLECYSTECTOMY, UPPER ENDOSCOPY  CHOLECYSTECTOMY LAPAROSCOPIC  ESOPHAGOGASTRODUODENOSCOPY (EGD)  Surgeon(s) and Role:     * Zahraa Jacob MD - Primary         Assistant Staff:  Physician Assistant: RAFFY Gregg; RAFFY Hawkins    Surgical Staff:  Circ-1: Kelly Jerez RN  Circ-Relief: Jose A Kohler RN  Physician Assistant: RAFFY Gregg; María Hawkinsma  Scrub Tech-1: Karyl Spatz  Event Time In   Incision Start 6717   Incision Close      Anesthesia: General   Estimated Blood Loss: 30 cc  Specimens:   ID Type Source Tests Collected by Time Destination   1 : Gallbladder Fresh Tissue  Zahraa Jacob MD 6/23/2017 4545 Pathology      Findings: Type III PEH, reduced/repaired (reinforcement with BioA); [address] fundoplication; chronic cholecystitis   Complications: none  Implants:   Implant Name Type Inv.  Item Serial No.  Lot No. LRB No. Used Action   MESH SHT BIO ABSORBABLE [id number] --  - [id number]   MESH SHT BIO ABSORBABLE [id number] --  [id number]  [employer] N/A N/A 1 Implanted

## 2017-06-23 NOTE — IP AVS SNAPSHOT
[address] 
[phone] Patient: Angel Banuelos MRN: [id number] OGM:[birth year] You are allergic to the following Allergen Reactions Latex Itching AND HIVES Sulfa (Sulfonamide Antibiotics) Anaphylaxis Broccoli Nausea and Vomiting Chlorhexidine Rash Diltiazem Nausea Only Gluten Other (comments) \" WANTS TO SLEEP 24 HOURS A DAY , AND IT AFFECTS MY HEART\", PT CAN'T ELABORATE. Ibuprofen Nausea and Vomiting Lactose Other (comments) EXTREME INDIGESTION Morphine Itching Patient stated Percocet (Oxycodone-Acetaminophen) Itching Propafenone Other (comments) Nausea and pain Tapioca Nausea and Vomiting Wheat Other (comments) \" WANTS TO SLEEP 24 HOURS A DAY\" Recent Documentation Height Weight BMI Smoking Status 1.778 m 78.5 kg 24.82 kg/m2 Never Smoker Emergency Contacts Name Discharge Info Relation Home Work Mobile Nikky Massey  Daughter [21] [phone] Amanda May CAREGIVER [3] Friend [5] [phone] About your hospitalization You were admitted on:  June 23, 2017 You last received care in the:  [place] [id number] You were discharged on:  June 26, 2017 Unit phone number:  [phone] Why you were hospitalized Your primary diagnosis was:  Not on File Your diagnoses also included:  Paraesophageal Hernia Providers Seen During Your Hospitalizations Provider Role Specialty Primary office phone Merari Gonzalez MD Attending Provider General Surgery [phone] Your Primary Care Physician (PCP) Primary Care Physician Office Phone Office Fax Wendy Carolina [phone] Follow-up Information Follow up With Details Comments Contact Info Sally Tineo MD   [address] [facility] 
[phone] Current Discharge Medication List  
  
START taking these medications Dose & Instructions Dispensing Information Comments Morning Noon Evening Bedtime HYDROcodone-acetaminophen 5-325 mg per tablet Commonly known as:  Candice Moreland Your last dose was: Your next dose is:    
   
   
 Dose:  1 Tab Take 1 Tab by mouth every four (4) hours as needed for Pain. Max Daily Amount: 6 Tabs. Quantity:  30 Tab Refills:  0  
     
   
   
   
  
 ondansetron 4 mg disintegrating tablet Commonly known as:  ZOFRAN ODT Your last dose was: Your next dose is:    
   
   
 Dose:  4 mg Take 1 Tab by mouth every eight (8) hours as needed for Nausea. Quantity:  10 Tab Refills:  1 CONTINUE these medications which have NOT CHANGED Dose & Instructions Dispensing Information Comments Morning Noon Evening Bedtime  
 aspirin delayed-release 81 mg tablet Your last dose was: Your next dose is:    
   
   
 Dose:  81 mg Take 1 Tab by mouth daily. Quantity:  30 Tab Refills:  1  
     
   
   
   
  
 calcium carbonate 200 mg calcium (500 mg) Chew Commonly known as:  TUMS Your last dose was: Your next dose is:    
   
   
 Dose:  1 Tab Take 1 Tab by mouth as needed. Refills:  0 Where to Get Your Medications Information on where to get these meds will be given to you by the nurse or doctor. ! Ask your nurse or doctor about these medications HYDROcodone-acetaminophen 5-325 mg per tablet  
 ondansetron 4 mg disintegrating tablet Discharge Instructions Call [phone] to schedule post-op appointment for week of 7/10. Nissen Fundoplication: What to Expect at [facility] Your Recovery You may be sore and have some pain in your belly for several weeks after surgery. If you had laparoscopic surgery, you also may have pain near your shoulder for a day or two after surgery. It may be hard for you to swallow for up to 6 weeks after the surgery. You may also have cramping in your belly, feel bloated, or pass more gas than before. When you burp, you may not get as much relief as you did before the surgery. The cramping and bloating usually go away in 2 to 3 months, but you may continue to pass more gas for a long time. Because the surgery makes your stomach a little smaller, you may get full more quickly when you eat. In 2 to 3 months, the stomach adjusts and you will be able to eat your usual amounts of food. How quickly you recover depends on whether you had a laparoscopic or open surgery. After laparoscopic surgery, most people can go back to work or their normal routine in about 2 to 3 weeks, depending on their work. After open surgery, you may need 4 to 6 weeks to get back to your normal routine. This care sheet gives you a general idea about how long it will take for you to recover. But each person recovers at a different pace. Follow the steps below to get better as quickly as possible. How can you care for yourself at home? Activity · Rest when you feel tired. Getting enough sleep will help you recover. · Try to walk each day. Start out by walking a little more than you did the day before. Bit by bit, increase the amount you walk. Walking boosts blood flow and helps prevent pneumonia and constipation. · For about 2 weeks, or 4 to 6 weeks if you had an open surgery, avoid lifting objects heavier than about 15 to 20 pounds. This may include heavy grocery bags and milk containers, a heavy briefcase or backpack, cat litter or dog food bags, a vacuum , or a child. · Do not do sit-ups or any exercise or activity that uses your belly muscles. · You can be active and do things around the house as you can tolerate it. Do not take part in any activity where you could be hit in the belly. This could be sports or playing with children. · You may shower. Pat your incisions dry. If you had an open surgery, you need to keep the incision dry until it begins to heal. Do not take baths until your doctor says it is okay. · Ask your doctor when you can drive again. · Ask your doctor when it is okay for you to have sex. Diet · For the first weekend, stay on a liquid diet. This includes broths, soups, milk shakes. On Monday, start pureed diet (yogurt, pudding, mashed potatoes, etc). Stay on this diet until first post-op appointment. · Have 5 or 6 small meals each day instead of 2 or 3 large meals. · If you feel full quickly, try to drink fluids between meals instead of with meals. · Avoid carbonated beverages, such as soda pop. · Avoid drinking with straws. This may help you swallow less air when you drink. · You may notice that your bowel movements are not regular right after your surgery. This is common. Try to avoid constipation and straining with bowel movements. Take a fiber supplement every day. If you have not had a bowel movement after a couple of days, ask your doctor about taking a mild laxative. Medicines · Your doctor will tell you if and when you can restart your medicines. He or she will also give you instructions about taking any new medicines. · If you take blood thinners, such as warfarin (Coumadin), clopidogrel (Plavix), or aspirin, be sure to talk to your doctor. He or she will tell you if and when to start taking those medicines again. Make sure that you understand exactly what your doctor wants you to do. · Take pain medicines exactly as directed. ¨ If the doctor gave you a prescription medicine for pain, take it as prescribed. ¨ If you are not taking a prescription pain medicine, ask your doctor if you can take an over-the-counter medicine. · If you think your pain medicine is making you sick to your stomach: 
¨ Take your medicine after meals (unless your doctor tells you not to). ¨ Ask your doctor for a different pain medicine. · If your doctor prescribed antibiotics, take them as directed. Do not stop taking them just because you feel better. You need to take the full course of antibiotics. · Continue to take your acid-reducing medicine for 1 month after surgery or as your doctor tells you. Incision care · If you have strips of tape on the cuts the doctor made (incisions), leave the tape on for a week or until it falls off. · Wash the area daily with warm, soapy water and pat it dry. Don't use hydrogen peroxide or alcohol, which can slow healing. You may cover the area with a gauze bandage if it weeps or rubs against clothing. Change the bandage every day. · Keep the area clean and dry. Follow-up care is a key part of your treatment and safety. Be sure to make and go to all appointments, and call your doctor if you are having problems. It's also a good idea to know your test results and keep a list of the medicines you take. When should you call for help? Call 911 anytime you think you may need emergency care. For example, call if: 
· You passed out (lost consciousness). · You have severe trouble breathing. · You have sudden chest pain and shortness of breath, or you cough up blood. · You have severe pain in your belly or chest. 
Call your doctor now or seek immediate medical care if: 
· You are sick to your stomach or cannot keep fluids down. · You have pain that does not get better after you take pain medicine. · You have signs of infection, such as: 
¨ Increased pain, swelling, warmth, or redness. ¨ Red streaks leading from the incision. ¨ Pus draining from the incision. ¨ A fever. · You have loose stitches, or your incision comes open. · You have signs of a blood clot, such as: ¨ Pain in your calf, back of the knee, thigh, or groin. ¨ Redness and swelling in your leg or groin. · You have trouble passing urine or stool, especially if you have pain or swelling in your lower belly. Watch closely for any changes in your health, and be sure to contact your doctor if: 
· You have a cough that does not go away or one that brings up mucus. · You have shoulder pain that lasts more than 3 days. · You do not have a bowel movement after taking a laxative. Where can you learn more? Go to http://kallie-audrey.info/. Enter A492 in the search box to learn more about \"Nissen Fundoplication: What to Expect at Home. \" Current as of: August 9, 2016 Content Version: 11.3 © 5413-9068 [employer]. Care instructions adapted under license by [employer] (which disclaims liability or warranty for this information). If you have questions about a medical condition or this instruction, always ask your healthcare professional. [place] [address] any warranty or liability for your use of this information. Discharge Orders None Introducing [facility]! Remigio Burroughs introduces [employer] patient portal. Now you can access parts of your medical record, email your doctor's office, and request medication refills online. 1. In your internet browser, go to [email or web address] 2. Click on the First Time User? Click Here link in the Sign In box. You will see the New Member Sign Up page. 3. Enter your Monetate Access Code exactly as it appears below. You will not need to use this code after youve completed the sign-up process. If you do not sign up before the expiration date, you must request a new code. · [employer] Access Code: [email or web address] Expires: 9/2/2017  4:53 PM 
 
4. Enter the last four digits of your Social Security Number (xxxx) and Date of Birth (mm/dd/yyyy) as indicated and click Submit.  You will be taken to the next sign-up page. 5. Create a smsPREP ID. This will be your smsPREP login ID and cannot be changed, so think of one that is secure and easy to remember. 6. Create a smsPREP password. You can change your password at any time. 7. Enter your Password Reset Question and Answer. This can be used at a later time if you forget your password. 8. Enter your e-mail address. You will receive e-mail notification when new information is available in [address] 9. Click Sign Up. You can now view and download portions of your medical record. 10. Click the Download Summary menu link to download a portable copy of your medical information. If you have questions, please visit the Frequently Asked Questions section of the smsPREP website. Remember, smsPREP is NOT to be used for urgent needs. For medical emergencies, dial 911. Now available from your iPhone and Android! General Information Please provide this summary of care documentation to your next provider. Patient Signature:  ____________________________________________________________ Date:  ____________________________________________________________  
  
Nora Burn Provider Signature:  ____________________________________________________________ Date:  ____________________________________________________________

## 2017-06-24 ENCOUNTER — APPOINTMENT (OUTPATIENT)
Dept: GENERAL RADIOLOGY | Age: 72
DRG: 328 | End: 2017-06-24
Attending: SURGERY
Payer: MEDICARE

## 2017-06-24 LAB
ANION GAP BLD CALC-SCNC: 9 MMOL/L (ref 5–15)
BASOPHILS # BLD AUTO: 0 K/UL (ref 0–0.1)
BASOPHILS # BLD: 0 % (ref 0–1)
BUN SERPL-MCNC: 15 MG/DL (ref 6–20)
BUN/CREAT SERPL: 14 (ref 12–20)
CALCIUM SERPL-MCNC: 8 MG/DL (ref 8.5–10.1)
CHLORIDE SERPL-SCNC: 106 MMOL/L (ref 97–108)
CO2 SERPL-SCNC: 23 MMOL/L (ref 21–32)
CREAT SERPL-MCNC: 1.09 MG/DL (ref 0.7–1.3)
DIFFERENTIAL METHOD BLD: ABNORMAL
EOSINOPHIL # BLD: 0 K/UL (ref 0–0.4)
EOSINOPHIL NFR BLD: 0 % (ref 0–7)
ERYTHROCYTE [DISTWIDTH] IN BLOOD BY AUTOMATED COUNT: 17.6 % (ref 11.5–14.5)
GLUCOSE SERPL-MCNC: 118 MG/DL (ref 65–100)
HCT VFR BLD AUTO: 28.1 % (ref 36.6–50.3)
HGB BLD-MCNC: 8.1 G/DL (ref 12.1–17)
LYMPHOCYTES # BLD AUTO: 6 % (ref 12–49)
LYMPHOCYTES # BLD: 0.9 K/UL (ref 0.8–3.5)
MCH RBC QN AUTO: 20.1 PG (ref 26–34)
MCHC RBC AUTO-ENTMCNC: 28.8 G/DL (ref 30–36.5)
MCV RBC AUTO: 69.7 FL (ref 80–99)
MONOCYTES # BLD: 0.8 K/UL (ref 0–1)
MONOCYTES NFR BLD AUTO: 5 % (ref 5–13)
NEUTS SEG # BLD: 13.7 K/UL (ref 1.8–8)
NEUTS SEG NFR BLD AUTO: 89 % (ref 32–75)
PLATELET # BLD AUTO: 265 K/UL (ref 150–400)
POTASSIUM SERPL-SCNC: 3.8 MMOL/L (ref 3.5–5.1)
RBC # BLD AUTO: 4.03 M/UL (ref 4.1–5.7)
RBC MORPH BLD: ABNORMAL
SODIUM SERPL-SCNC: 138 MMOL/L (ref 136–145)
WBC # BLD AUTO: 15.4 K/UL (ref 4.1–11.1)

## 2017-06-24 PROCEDURE — 85025 COMPLETE CBC W/AUTO DIFF WBC: CPT | Performed by: SURGERY

## 2017-06-24 PROCEDURE — 65270000032 HC RM SEMIPRIVATE

## 2017-06-24 PROCEDURE — 80048 BASIC METABOLIC PNL TOTAL CA: CPT | Performed by: SURGERY

## 2017-06-24 PROCEDURE — 74241 XR UPPER GI SERIES W KUB: CPT

## 2017-06-24 PROCEDURE — 74011250636 HC RX REV CODE- 250/636: Performed by: SURGERY

## 2017-06-24 PROCEDURE — 74011250637 HC RX REV CODE- 250/637: Performed by: SURGERY

## 2017-06-24 PROCEDURE — 36415 COLL VENOUS BLD VENIPUNCTURE: CPT | Performed by: SURGERY

## 2017-06-24 RX ORDER — OXYCODONE AND ACETAMINOPHEN 5; 325 MG/1; MG/1
1-2 TABLET ORAL
Status: DISCONTINUED | OUTPATIENT
Start: 2017-06-24 | End: 2017-06-26 | Stop reason: HOSPADM

## 2017-06-24 RX ORDER — SODIUM CHLORIDE 0.9 % (FLUSH) 0.9 %
SYRINGE (ML) INJECTION
Status: DISPENSED
Start: 2017-06-24 | End: 2017-06-24

## 2017-06-24 RX ORDER — SODIUM CHLORIDE, SODIUM LACTATE, POTASSIUM CHLORIDE, CALCIUM CHLORIDE 600; 310; 30; 20 MG/100ML; MG/100ML; MG/100ML; MG/100ML
100 INJECTION, SOLUTION INTRAVENOUS CONTINUOUS
Status: DISPENSED | OUTPATIENT
Start: 2017-06-24 | End: 2017-06-25

## 2017-06-24 RX ORDER — DIPHENHYDRAMINE HCL 25 MG
25 CAPSULE ORAL
Status: DISCONTINUED | OUTPATIENT
Start: 2017-06-24 | End: 2017-06-25

## 2017-06-24 RX ADMIN — OXYCODONE HYDROCHLORIDE AND ACETAMINOPHEN 1 TABLET: 5; 325 TABLET ORAL at 11:25

## 2017-06-24 RX ADMIN — OXYCODONE HYDROCHLORIDE AND ACETAMINOPHEN 1 TABLET: 5; 325 TABLET ORAL at 21:46

## 2017-06-24 RX ADMIN — ENOXAPARIN SODIUM 40 MG: 40 INJECTION SUBCUTANEOUS at 08:09

## 2017-06-24 RX ADMIN — DIPHENHYDRAMINE HYDROCHLORIDE 25 MG: 25 CAPSULE ORAL at 22:35

## 2017-06-24 RX ADMIN — DIPHENHYDRAMINE HYDROCHLORIDE 25 MG: 25 CAPSULE ORAL at 16:46

## 2017-06-24 RX ADMIN — SODIUM CHLORIDE, SODIUM LACTATE, POTASSIUM CHLORIDE, AND CALCIUM CHLORIDE 100 ML/HR: 600; 310; 30; 20 INJECTION, SOLUTION INTRAVENOUS at 03:57

## 2017-06-24 RX ADMIN — Medication 10 ML: at 01:10

## 2017-06-24 RX ADMIN — ACETAMINOPHEN 650 MG: 650 SOLUTION ORAL at 03:12

## 2017-06-24 RX ADMIN — ACETAMINOPHEN 650 MG: 650 SOLUTION ORAL at 08:06

## 2017-06-24 RX ADMIN — OXYCODONE HYDROCHLORIDE AND ACETAMINOPHEN 1 TABLET: 5; 325 TABLET ORAL at 19:32

## 2017-06-24 NOTE — OP NOTES
[address] [facility] [place] [address], [address]   OP NOTE       Name:  Evelin Dumont   MR#:  [id number]   :  1945   Account #:  [de-identified]    Surgery Date:  2017   Date of Adm:  2017       PREOPERATIVE DIAGNOSES:   1. Paraesophageal hernia. 2. Biliary dyskinesia. POSTOPERATIVE DIAGNOSES:   1. Type 3 paraesophageal hernia. 2. Biliary dyskinesia. PROCEDURES PERFORMED:   1. Laparoscopic paraesophageal hernia repair with mesh (CPT Q4452331). 2. Laparoscopic cholecystectomy (CPT 26411). ATTENDING SURGEON: Ramone Dumont MD.    ASSISTANTRAFFY Griffin.    ANESTHESIA: General endotracheal.    DRAINS: None. SPONGE COUNT: Correct. NEEDLE COUNT: Correct. SPECIMEN REMOVED: Gallbladder with contents. ESTIMATED BLOOD LOSS: 30 mL. COMPLICATIONS: None. INDICATIONS: The patient is a 71-year-old white male with right upper   quadrant pain associated with nausea, present for several months. Symptoms are worsened with oral intake. He also notes nausea and   gastroesophageal reflux disease symptoms. Ultrasound is normal, but   on HIDA scan he had symptom reproduction with CCK administration. Additionally, upper gastrointestinal series reveals a paraesophageal   hernia, with the entire stomach herniated into the mediastinum. He is   taken to the operating today for laparoscopic paraesophageal hernia   repair with cholecystectomy. I have asked RAFFY Juan, to assist   with the procedure given the technical complexity of laparoscopic   foregut surgery. She will run the laparoscope and assist in reduction of   the paraesophageal hernia, its repair, the fundoplication and   cholecystectomy. FINDINGS:   1. Type 3 paraesophageal hernia with the entire stomach herniated   into mediastinum. Hernia, reduced and repaired primarily with   reinforcement using Bio-A mesh. 2. 270 degree Toupet partial fundoplication.    3. Chronic cholecystitis. DESCRIPTION OF PROCEDURE:  The patient was identified as the   correct the patient in the preoperative holding area and informed   consent was confirmed. After answering the patient's remaining   questions, he was taken to the operating room and placed on the   operating room table in the supine position. Sequential compression   devices were placed on both lower extremities. Following the   uneventful initiation of general anesthesia, he was carefully secured to   the operating room table with foot board and safety strap in place. All   potential pressure points were padded with egg crate. His abdomen   was prepped and draped in the usual sterile fashion. Final timeout was   performed, and it was confirmed he had received intravenous   antibiotics. A 5 mm trocar was inserted through a small left subcostal skin incision   using an Optiview technique. After confirming intraperitoneal location   of the trocar tip, insufflation with carbon dioxide gas was initiated. Once   adequate working space had been developed, the 5 mm, 30-degree   laparoscope was inserted. No signs of trocar injury were present. The   liver was noted to be of normal size and texture. A 5 mm trocar was   inserted 8 cm superior to the umbilicus using visual guidance with the   laparoscope. The patient was placed in the steep reverse   Trendelenburg position. Two right upper quadrant trocars, one 5 mm   and the other 12 mm, were inserted using identical technique. The   [facility] liver retractor was inserted through a small subxiphoid   incision. With the liver retracted anteriorly and cephalad, the   esophageal hiatus was visualized. The entire stomach had herniated   into the mediastinum with a segment of omentum. The stomach was   retracted into the abdominal space using careful retraction. The pars   flaccida portion of the gastrohepatic ligament was incised, allowing   atraumatic entry into the lesser sac.  The gastrohepatic ligament was   serially ligated and divided using the bipolar device. This was carried to   the right marylin of the diaphragm. A plane was developed between the   medial border of the right marylin of the diaphragm and hernia sac,   proceeding in a posterior to anterior direction into the mediastinum. The right pleura was identified and preserved from injury. As the   dissection was continued anteriorly, the esophagus was visualized   further into the mediastinum. The gastrocolic ligament was incised   along the greater curve of the stomach. This allowed atraumatic entry   into the lesser sac. The gastrocolic ligament was serially ligated and   divided using the bipolar device. This included takedown of the short   gastric vessels and retrogastric attachments. This brought the   dissection to the base of the left marylin of the diaphragm. A plane was   developed between the hernia sac and the medial border of the left   marylin of the diaphragm proceeding in a posterior to anterior direction. This allowed communication with the dissection from the right. Posteriorly, the joining of the right and left crura was identified, and this   plane was developed such that a retractor could be passed posterior to   the stomach, allowing a Penrose drain to be placed around the upper   stomach. This allowed atraumatic retraction of the viscera. Mediastinal   dissection was then performed, freeing the hernia sac from the left and   right pleura, the aorta, and anterior mediastinum. This was carried to   the level of the azygous vein, close to the wilfrid. This dissection was   performed using a combination of the bipolar device and blunt   dissection. This allowed complete reduction of the hernia sac and   development of a 3 cm segment of intra-abdominal esophagus.  With   the hernia sac retracted into the abdominal space, the paraesophageal   hernia was repaired with multiple figure-of-eight 0 Surgidac sutures   placed at the posterior cruroplasty. This repair was reinforced with a   segment of Bio-A mesh, which was secured to the diaphragm with   interrupted 0 Surgidac sutures. The esophagus was serially dilated   using a tapered bougie up to a size 60. Posterior fundoplication was   then performed using interrupted 0 Surgidac sutures. Care was taken   to avoid twisting of the fundoplication and a 2 cm 097 degree wrap   was thus constructed. The bougie was then removed. The [facility]   liver retractor was then removed. Plans were then made for cholecystectomy. The gallbladder was   grasped at the fundus with retraction oriented cephalad. The paracolic   adhesions to the body of the gallbladder were taken down using   electrocautery. A plane was developed at the peritoneum at the dome   of the gallbladder. The gallbladder was then dissected in a dome-  down, fundus first technique, freeing the gallbladder from the liver   using a combination of electrocautery and blunt dissection. With   continued dissection, the cystic artery was identified and controlled   using the bipolar device. Additional peritoneal tissue was stripped   away from the infundibulum, allowing the only structure to be   connected to the gallbladder to be identified as the cystic duct. The   cystic duct was controlled using doubly placed 0 PDS Endoloops and   then divided using the bipolar device. The gallbladder was placed   within a retrieval bag and removed from the patient's body. Once pneumoperitoneum had been re-established, the right upper   quadrant was irrigated with sterile saline. After confirming adequate   hemostasis, the right upper quadrant 12 mm fascial defect was closed   with a 0 Vicryl suture using a laparoscopic suture passer. Pneumoperitoneum was released, and all trocars were removed. All   wounds were infiltrated with 0.5% Marcaine without epinephrine.  All   skin edges were reapproximated with a combination of subcuticular 4-0   Monocryl suture and Dermabond. The patient tolerated the procedure   well. He was extubated in the operating room and transported to the   recovery area in stable condition. The attending surgeon, Dr. Kamila Samson, was scrubbed and present for   the entire procedure.         MD JOSE Johnson / MAKENNA   D:  06/23/2017   17:21   T:  06/23/2017   20:37   Job #:  [id number]

## 2017-06-24 NOTE — PROGRESS NOTES
Spiritual Care Partner Volunteer visited patient in [address] [place] [address] on 6.24.17.     Documented by:  Myra Coleman, Staff   Please call [phone] to page  if needed

## 2017-06-24 NOTE — PROGRESS NOTES
2838 Patient complaining of itching and is requesting medication to assist with symptoms. Brunilda TAYLOR.    6348 Dr. Lemuel Galeazzi returned page and gave verbal order for benadryl PO 25mg Q6H for itching.

## 2017-06-24 NOTE — PROGRESS NOTES
General Surgery Daily Progress Note    Admit Date: 2017  Post-Operative Day: 1 Day Post-Op from Procedure(s):  LAPAROSCOPIC PARAESOPHAGEAL HERNIA REPAIR WITH MESH  CHOLECYSTECTOMY LAPAROSCOPIC     Subjective:     Last 24 hrs: Doing well this morning. Reports diffuse tightness in upper abdomen & pain in RLQ. Up in bed eating a popsicle. Just returned from radiology. Passed gas on way downstairs and just had BM in bed. Denies fevers or chills. Denies dysuria. Very nervous about being discharged to home today, as he lives alone. Objective:     Blood pressure 138/67, pulse 74, temperature 99.8 °F (37.7 °C), resp. rate 16, height 5' 10\" (1.778 m), weight 173 lb (78.5 kg), SpO2 94 %. Temp (24hrs), Av.1 °F (37.3 °C), Min:97.6 °F (36.4 °C), Max:99.8 °F (37.7 °C)      _____________________  Physical Exam:     Alert and Oriented, talkative, in no acute distress. Cardiovascular: RRR, no peripheral edema  Lungs:CTAB   Abdomen: soft, appropriate TTP in incisional areas. Wounds clean & dry without surrounding erythema. + BS      Assessment:   Active Problems:    Paraesophageal hernia (2017)            Plan: Will advance diet to full liquids. Continue to encourage OOB & ambulation with assistance. Pain management as needed. Anticipate discharge to home tomorrow morning. Data Review:    Recent Labs      17   0403   WBC  15.4*   HGB  8.1*   HCT  28.1*   PLT  265     Recent Labs      17   0403   NA  138   K  3.8   CL  106   CO2  23   GLU  118*   BUN  15   CREA  1.09   CA  8.0*     No results for input(s): AML, LPSE in the last 72 hours.         ______________________  Medications:    Current Facility-Administered Medications   Medication Dose Route Frequency    oxyCODONE-acetaminophen (PERCOCET) 5-325 mg per tablet 1-2 Tab  1-2 Tab Oral Q4H PRN    sodium chloride (NS) flush 5-10 mL  5-10 mL IntraVENous Q8H    sodium chloride (NS) flush 5-10 mL  5-10 mL IntraVENous PRN    morphine injection 1 mg  1 mg IntraVENous Q2H PRN    enoxaparin (LOVENOX) injection 40 mg  40 mg SubCUTAneous Q24H    acetaminophen (TYLENOL) solution 650 mg  650 mg Oral Q4H PRN    prochlorperazine (COMPAZINE) with saline injection 5 mg  5 mg IntraVENous Q8H PRN       Nirmala Hill PA-C  6/24/2017              Pt seen and examined  Has started to tolerate full liquids  Complaining of some pain     Gen- Alert in NAD  Lungs-CTA  H- RRR  Abd- soft incisional tenderness   Incisions are clean       ugi- negative    S/p paraesophageal   Hernia repair  Overall doing well  Continue pain control   Full liquids

## 2017-06-25 ENCOUNTER — APPOINTMENT (OUTPATIENT)
Dept: GENERAL RADIOLOGY | Age: 72
DRG: 328 | End: 2017-06-25
Attending: SURGERY
Payer: MEDICARE

## 2017-06-25 LAB
ANION GAP BLD CALC-SCNC: 6 MMOL/L (ref 5–15)
BASOPHILS # BLD AUTO: 0 K/UL (ref 0–0.1)
BASOPHILS # BLD: 0 % (ref 0–1)
BUN SERPL-MCNC: 12 MG/DL (ref 6–20)
BUN/CREAT SERPL: 12 (ref 12–20)
CALCIUM SERPL-MCNC: 8.4 MG/DL (ref 8.5–10.1)
CHLORIDE SERPL-SCNC: 107 MMOL/L (ref 97–108)
CO2 SERPL-SCNC: 28 MMOL/L (ref 21–32)
CREAT SERPL-MCNC: 1.03 MG/DL (ref 0.7–1.3)
DIFFERENTIAL METHOD BLD: ABNORMAL
EOSINOPHIL # BLD: 0.2 K/UL (ref 0–0.4)
EOSINOPHIL NFR BLD: 2 % (ref 0–7)
ERYTHROCYTE [DISTWIDTH] IN BLOOD BY AUTOMATED COUNT: 17.6 % (ref 11.5–14.5)
GLUCOSE SERPL-MCNC: 94 MG/DL (ref 65–100)
HCT VFR BLD AUTO: 31.6 % (ref 36.6–50.3)
HGB BLD-MCNC: 9 G/DL (ref 12.1–17)
LYMPHOCYTES # BLD AUTO: 15 % (ref 12–49)
LYMPHOCYTES # BLD: 1.3 K/UL (ref 0.8–3.5)
MAGNESIUM SERPL-MCNC: 2 MG/DL (ref 1.6–2.4)
MCH RBC QN AUTO: 20 PG (ref 26–34)
MCHC RBC AUTO-ENTMCNC: 28.5 G/DL (ref 30–36.5)
MCV RBC AUTO: 70.2 FL (ref 80–99)
MONOCYTES # BLD: 0.7 K/UL (ref 0–1)
MONOCYTES NFR BLD AUTO: 8 % (ref 5–13)
NEUTS SEG # BLD: 6.3 K/UL (ref 1.8–8)
NEUTS SEG NFR BLD AUTO: 75 % (ref 32–75)
PHOSPHATE SERPL-MCNC: 2.9 MG/DL (ref 2.6–4.7)
PLATELET # BLD AUTO: 266 K/UL (ref 150–400)
PLATELET COMMENTS,PCOM: ABNORMAL
POTASSIUM SERPL-SCNC: 3.7 MMOL/L (ref 3.5–5.1)
RBC # BLD AUTO: 4.5 M/UL (ref 4.1–5.7)
RBC MORPH BLD: ABNORMAL
SODIUM SERPL-SCNC: 141 MMOL/L (ref 136–145)
WBC # BLD AUTO: 8.5 K/UL (ref 4.1–11.1)

## 2017-06-25 PROCEDURE — 74011250637 HC RX REV CODE- 250/637: Performed by: SURGERY

## 2017-06-25 PROCEDURE — 74011250636 HC RX REV CODE- 250/636: Performed by: SURGERY

## 2017-06-25 PROCEDURE — 84100 ASSAY OF PHOSPHORUS: CPT | Performed by: SURGERY

## 2017-06-25 PROCEDURE — 80048 BASIC METABOLIC PNL TOTAL CA: CPT | Performed by: SURGERY

## 2017-06-25 PROCEDURE — 73030 X-RAY EXAM OF SHOULDER: CPT

## 2017-06-25 PROCEDURE — 85025 COMPLETE CBC W/AUTO DIFF WBC: CPT | Performed by: SURGERY

## 2017-06-25 PROCEDURE — 65270000032 HC RM SEMIPRIVATE

## 2017-06-25 PROCEDURE — 83735 ASSAY OF MAGNESIUM: CPT | Performed by: SURGERY

## 2017-06-25 PROCEDURE — 36415 COLL VENOUS BLD VENIPUNCTURE: CPT | Performed by: SURGERY

## 2017-06-25 RX ORDER — DIPHENHYDRAMINE HCL 25 MG
25 CAPSULE ORAL
Status: DISCONTINUED | OUTPATIENT
Start: 2017-06-25 | End: 2017-06-26 | Stop reason: HOSPADM

## 2017-06-25 RX ADMIN — DIPHENHYDRAMINE HYDROCHLORIDE 25 MG: 25 CAPSULE ORAL at 08:33

## 2017-06-25 RX ADMIN — SODIUM CHLORIDE, SODIUM LACTATE, POTASSIUM CHLORIDE, AND CALCIUM CHLORIDE 100 ML/HR: 600; 310; 30; 20 INJECTION, SOLUTION INTRAVENOUS at 16:20

## 2017-06-25 RX ADMIN — Medication 10 ML: at 14:00

## 2017-06-25 RX ADMIN — OXYCODONE HYDROCHLORIDE AND ACETAMINOPHEN 2 TABLET: 5; 325 TABLET ORAL at 13:54

## 2017-06-25 RX ADMIN — Medication 10 ML: at 20:53

## 2017-06-25 RX ADMIN — DIPHENHYDRAMINE HYDROCHLORIDE 25 MG: 25 CAPSULE ORAL at 19:13

## 2017-06-25 RX ADMIN — OXYCODONE HYDROCHLORIDE AND ACETAMINOPHEN 1 TABLET: 5; 325 TABLET ORAL at 09:56

## 2017-06-25 RX ADMIN — Medication 10 ML: at 08:32

## 2017-06-25 RX ADMIN — SODIUM CHLORIDE, SODIUM LACTATE, POTASSIUM CHLORIDE, AND CALCIUM CHLORIDE 100 ML/HR: 600; 310; 30; 20 INJECTION, SOLUTION INTRAVENOUS at 01:58

## 2017-06-25 RX ADMIN — ENOXAPARIN SODIUM 40 MG: 40 INJECTION SUBCUTANEOUS at 08:17

## 2017-06-25 RX ADMIN — OXYCODONE HYDROCHLORIDE AND ACETAMINOPHEN 1 TABLET: 5; 325 TABLET ORAL at 09:52

## 2017-06-25 RX ADMIN — DIPHENHYDRAMINE HYDROCHLORIDE 25 MG: 25 CAPSULE ORAL at 15:37

## 2017-06-25 NOTE — PROGRESS NOTES
1835 Patient complaining of itching would like more benadryl. Brunilda TAYLOR to notify    DONY Reyes returned page and gave order for benadryl 25mg PO Q4H PRN for itching.

## 2017-06-25 NOTE — PROGRESS NOTES
Bedside shift change report given to Michael Guillaume RN (oncoming nurse) by Stevan Cruz (offgoing nurse). Report included the following information SBAR and Kardex.

## 2017-06-25 NOTE — PROGRESS NOTES
Subjective  Complains of shoulder pain. Tolerating full liquids     Temp:  [97.6 °F (36.4 °C)-99.8 °F (37.7 °C)]   Pulse (Heart Rate):  []   BP: (116-138)/(56-82)   Resp Rate:  [10-25]   O2 Sat (%):  [90 %-100 %]   Weight:  [173 lb (78.5 kg)]   06/25 0701 - 06/25 1900  In: 240 [P.O.:240]  Out: 2 [Urine:2]  06/23 1901 - 06/25 0700  In: 1100 [P.O.:300; I.V.:800]  Out: 2227 [Urine:2227]      Objective  Gen- Alert in NAD  Lungs- CTA   H- RRR  Abd- S/nt/nd + BS    Recent Results (from the past 24 hour(s))   CBC WITH AUTOMATED DIFF    Collection Time: 06/25/17  6:18 AM   Result Value Ref Range    WBC 8.5 4.1 - 11.1 K/uL    RBC 4.50 4. 10 - 5.70 M/uL    HGB 9.0 (L) 12.1 - 17.0 g/dL    HCT 31.6 (L) 36.6 - 50.3 %    MCV 70.2 (L) 80.0 - 99.0 FL    MCH 20.0 (L) 26.0 - 34.0 PG    MCHC 28.5 (L) 30.0 - 36.5 g/dL    RDW 17.6 (H) 11.5 - 14.5 %    PLATELET 017 117 - 518 K/uL    NEUTROPHILS 75 32 - 75 %    LYMPHOCYTES 15 12 - 49 %    MONOCYTES 8 5 - 13 %    EOSINOPHILS 2 0 - 7 %    BASOPHILS 0 0 - 1 %    ABS. NEUTROPHILS 6.3 1.8 - 8.0 K/UL    ABS. LYMPHOCYTES 1.3 0.8 - 3.5 K/UL    ABS. MONOCYTES 0.7 0.0 - 1.0 K/UL    ABS. EOSINOPHILS 0.2 0.0 - 0.4 K/UL    ABS.  BASOPHILS 0.0 0.0 - 0.1 K/UL    DF SMEAR SCANNED      PLATELET COMMENTS LARGE PLATELETS      RBC COMMENTS OVALOCYTES  PRESENT        RBC COMMENTS POLYCHROMASIA  1+        RBC COMMENTS ANISOCYTOSIS  1+       METABOLIC PANEL, BASIC    Collection Time: 06/25/17  6:18 AM   Result Value Ref Range    Sodium 141 136 - 145 mmol/L    Potassium 3.7 3.5 - 5.1 mmol/L    Chloride 107 97 - 108 mmol/L    CO2 28 21 - 32 mmol/L    Anion gap 6 5 - 15 mmol/L    Glucose 94 65 - 100 mg/dL    BUN 12 6 - 20 MG/DL    Creatinine 1.03 0.70 - 1.30 MG/DL    BUN/Creatinine ratio 12 12 - 20      GFR est AA >60 >60 ml/min/1.73m2    GFR est non-AA >60 >60 ml/min/1.73m2    Calcium 8.4 (L) 8.5 - 10.1 MG/DL   MAGNESIUM    Collection Time: 06/25/17  6:18 AM   Result Value Ref Range    Magnesium 2.0 1.6 - 2.4 mg/dL   PHOSPHORUS    Collection Time: 06/25/17  6:18 AM   Result Value Ref Range    Phosphorus 2.9 2.6 - 4.7 MG/DL         Active Problems:    Paraesophageal hernia (6/6/2017)          Assessment & Plan  S/p Paraesophageal hernia repair. I tried to explain that shoulder pain is normal after this procedure- We wants an XRAY- will order  Pt does not want to be discharged as he has no one at home with him. - states he will have someone tomorrow and will feel comfortable then .    Continue full liquid

## 2017-06-25 NOTE — PROGRESS NOTES
Patient has been upset this evening because he was told that he would probably be discharged tomorrow. He has not been able to rest well since receiving this news. Dr. Nancy Martínez was notified about how upset the patient has been this evening. After reassuring patient that we called the doctor, the patient was very grateful. The patient states that he will rest well now.

## 2017-06-26 VITALS
RESPIRATION RATE: 16 BRPM | HEIGHT: 70 IN | SYSTOLIC BLOOD PRESSURE: 117 MMHG | WEIGHT: 173 LBS | BODY MASS INDEX: 24.77 KG/M2 | OXYGEN SATURATION: 94 % | HEART RATE: 72 BPM | TEMPERATURE: 98.9 F | DIASTOLIC BLOOD PRESSURE: 71 MMHG

## 2017-06-26 PROCEDURE — 74011250636 HC RX REV CODE- 250/636: Performed by: SURGERY

## 2017-06-26 PROCEDURE — 74011250637 HC RX REV CODE- 250/637: Performed by: SURGERY

## 2017-06-26 RX ADMIN — OXYCODONE HYDROCHLORIDE AND ACETAMINOPHEN 1 TABLET: 5; 325 TABLET ORAL at 13:38

## 2017-06-26 RX ADMIN — DIPHENHYDRAMINE HYDROCHLORIDE 25 MG: 25 CAPSULE ORAL at 08:59

## 2017-06-26 RX ADMIN — ENOXAPARIN SODIUM 40 MG: 40 INJECTION SUBCUTANEOUS at 08:59

## 2017-06-26 RX ADMIN — Medication 5 ML: at 06:00

## 2017-06-26 RX ADMIN — ACETAMINOPHEN 650 MG: 650 SOLUTION ORAL at 00:12

## 2017-06-26 NOTE — PROGRESS NOTES
Problem: Discharge Planning  Goal: *Discharge to safe environment  Outcome: Progressing Towards Goal  Discharge disposition: Home.      SHANITA Perez/CRM

## 2017-06-26 NOTE — PROGRESS NOTES
Progress Note    Patient: Yonas Umanzor MRN: [id number]  SSN: [id number]    YOB: 1945  Age: 71 y.o. Sex: male      Admit Date: 2017    3 Days Post-Op    Procedure:  Procedure(s):  LAPAROSCOPIC PARAESOPHAGEAL HERNIA REPAIR WITH MESH  CHOLECYSTECTOMY LAPAROSCOPIC    Subjective:     Patient notes intermittent left shoulder, RLQ pain; tolerating liquids. Objective:     Visit Vitals    /71    Pulse 72    Temp 98.9 °F (37.2 °C)    Resp 16    Ht 5' 10\" (1.778 m)    Wt 173 lb (78.5 kg)    SpO2 94%    BMI 24.82 kg/m2       Temp (24hrs), Av.4 °F (37.4 °C), Min:98.9 °F (37.2 °C), Max:100.1 °F (37.8 °C)      Physical Exam:    ABDOMEN: Non-distended, soft; wounds intact with surrounding ecchymosis. Appropriate incisional pain with palpation. Data Review: VS, I/O's, Radiographs (left shoulder with DJD)         Assessment:     Hospital Problems  Date Reviewed: 6/10/2017          Codes Class Noted POA    Paraesophageal hernia ICD-10-CM: K44.9  ICD-9-CM: 553.3  2017 Unknown              Plan/Recommendations/Medical Decision Makin. Discharge to home on pureed diet.     Signed By: Levi Irvin MD     2017

## 2017-06-26 NOTE — PROGRESS NOTES
Bedside shift change report given to [address] (oncoming nurse) by Missael Chung (offgoing nurse). Report included the following information SBAR, Kardex, Intake/Output and MAR.

## 2017-06-26 NOTE — PROGRESS NOTES
Discharge instructions reviewed with patient. Information on prescriptions given. Question time offered. Understanding good.

## 2017-06-27 NOTE — PROGRESS NOTES
Charted on 6/27/17  Spiritual Care Partner Volunteer visited patient in Surgical Unit on 6/26/17.   Documented by:  [phone] [place] ([id number])

## 2017-06-29 ENCOUNTER — TELEPHONE (OUTPATIENT)
Dept: SURGERY | Age: 72
End: 2017-06-29

## 2017-06-29 NOTE — DISCHARGE SUMMARY
Physician Discharge Summary     Patient ID:  Megan Banuelos  [id number]  42 y.o.  1945    Admit Date: 6/23/2017    Discharge Date: 6/26/2017    * Admission Diagnoses: PARAESOPHAGEAL HERNIA, BILIARY DYSKINESIA;Paraesophageal he*    * Discharge Diagnoses:    Hospital Problems as of 6/26/2017  Date Reviewed: 6/10/2017          Codes Class Noted - Resolved POA    Paraesophageal hernia ICD-10-CM: K44.9  ICD-9-CM: 553.3  6/6/2017 - Present Unknown               Admission Condition: Good    * Discharge Condition: good    * Procedures: Procedure(s):  LAPAROSCOPIC PARAESOPHAGEAL HERNIA REPAIR WITH MESH  CHOLECYSTECTOMY LAPAROSCOPIC    * Hospital Course:   He was admitted for laparoscopic paraesophageal hernia repair. POD#1 UGI without leak or obstruction. His diet was advanced to full liquids. He complained of left shoulder pain worse after the procedure. Plain films were stable. His oral intake improved, and he was discharged to home on full liquids 6/26. Consults: None    Significant Diagnostic Studies: radiology: POD#1 UGI without leak or obstruction    * Disposition: Home    Discharge Medications:   Discharge Medication List as of 6/26/2017  1:09 PM      START taking these medications    Details   HYDROcodone-acetaminophen (NORCO) 5-325 mg per tablet Take 1 Tab by mouth every four (4) hours as needed for Pain. Max Daily Amount: 6 Tabs., Print, Disp-30 Tab, R-0      ondansetron (ZOFRAN ODT) 4 mg disintegrating tablet Take 1 Tab by mouth every eight (8) hours as needed for Nausea. , Print, Disp-10 Tab, R-1         CONTINUE these medications which have NOT CHANGED    Details   calcium carbonate (TUMS) 200 mg calcium (500 mg) chew Take 1 Tab by mouth as needed., Historical Med      aspirin delayed-release 81 mg tablet Take 1 Tab by mouth daily. , OTC, Disp-30 Tab, R-1             * Follow-up Care/Patient Instructions:   Activity: No heavy lifting, pushing, pulling x 2 weeks  Diet: full liquids  Wound Care: Keep wounds clean and dry    Follow-up Information     Follow up With Details Comments [address], [address] [facility] [address] [address] [id number]          Future Appointments  Date Time Provider Nasir Grover   7/10/2017 10:15 AM Forrest Yin [facility] LEANA SCHED         Signed:  Zay Tiwari MD  6/29/2017  4:41 AM

## 2017-06-29 NOTE — TELEPHONE ENCOUNTER
Patient was concerned if he can eat foods like rice or scrambled eggs. Also, he wants to know when his incision can get wet.

## 2017-06-30 NOTE — TELEPHONE ENCOUNTER
Advised patient no scramble eggs or rice until next Friday July 7th only liquid, broth , soups, protein shakes and jello at this time would be fine to consume. Also advised that he can get the incision wet during a shower.

## 2017-07-10 ENCOUNTER — OFFICE VISIT (OUTPATIENT)
Dept: SURGERY | Age: 72
End: 2017-07-10

## 2017-07-10 VITALS
RESPIRATION RATE: 14 BRPM | WEIGHT: 168.5 LBS | HEIGHT: 70 IN | OXYGEN SATURATION: 98 % | DIASTOLIC BLOOD PRESSURE: 80 MMHG | SYSTOLIC BLOOD PRESSURE: 132 MMHG | TEMPERATURE: 98.3 F | BODY MASS INDEX: 24.12 KG/M2 | HEART RATE: 66 BPM

## 2017-07-10 DIAGNOSIS — Z09 POSTOPERATIVE EXAMINATION: Primary | ICD-10-CM

## 2017-07-10 PROBLEM — K44.9 PARAESOPHAGEAL HERNIA: Status: RESOLVED | Noted: 2017-06-06 | Resolved: 2017-07-10

## 2017-07-10 NOTE — PROGRESS NOTES
Subjective:      Michael Banuelos is a 71 y.o. male presents for postop care 2.5 weeks following Laparoscopic Paraesophageal hernia repair with mesh & Laparoscopic cholecystectomy  by Dr. Amena Palmer on 6/23/2017. Appetite is good. Eating a regular diet without difficulty. Bowel movements are regular. Denies nausea or diarrhea. The patient is voiding without difficulty. Denies dysuria. The patient reports improving 5/10 RLQ pain. He is not taking analgesics and feels that this pain is improving. Denies fevers, chills, CP or SOB. Reports fatigue, but reports this to be a chronic problem. Objective:     Visit Vitals    /80 (BP 1 Location: Left arm, BP Patient Position: Sitting)    Pulse 66    Temp 98.3 °F (36.8 °C) (Oral)    Resp 14    Ht 5' 10\" (1.778 m)    Wt 168 lb 8 oz (76.4 kg)    SpO2 98%    BMI 24.18 kg/m2       General:  alert, cooperative, no distress, appears stated age   Abdomen: soft, bowel sounds active, non-tender, no hernias   Incision:   healing well, no drainage, no erythema, no hernia, no seroma, no swelling, no dehiscence, incision well approximated     Assessment:     Doing well postoperatively. Plan:     1. May continue current medications. 2. Wound care discussed. 3. Pt is to increase activities as tolerated. Reassured that his fatigue will continue to improve. Mr. Banuelos verbalized understanding and questions were answered to the best of my knowledge and ability. Healthy lifestyle educational materials were provided.

## 2017-07-10 NOTE — MR AVS SNAPSHOT
Visit Information Date & Time Provider Department Dept. Phone Encounter #  
 7/10/2017 10:15 AM RAFFY Uribe [place] [phone] [id number] Follow-up Instructions Return if symptoms worsen or fail to improve. Upcoming Health Maintenance Date Due COLONOSCOPY 4/25/1964 DTaP/Tdap/Td series (1 - Tdap) 4/25/1967 ZOSTER VACCINE AGE 60> 4/25/2006 GLAUCOMA SCREENING Q2Y 4/25/2011 Pneumococcal 65+ Low/Medium Risk (1 of 2 - PCV13) 4/25/2011 MEDICARE YEARLY EXAM 4/25/2011 INFLUENZA AGE 9 TO ADULT 8/1/2017 Allergies as of 7/10/2017  Review Complete On: 7/10/2017 By: RAFFY Uribe Severity Noted Reaction Type Reactions Latex  12/28/2016    Itching AND HIVES Sulfa (Sulfonamide Antibiotics) High 06/07/2013    Anaphylaxis Broccoli  12/28/2016    Nausea and Vomiting Chlorhexidine  01/05/2017    Rash Diltiazem  07/22/2015    Nausea Only Gluten  12/28/2016    Other (comments) \" WANTS TO SLEEP 24 HOURS A DAY , AND IT AFFECTS MY HEART\", PT CAN'T ELABORATE. Ibuprofen  12/28/2016    Nausea and Vomiting Lactose  12/28/2016    Other (comments) EXTREME INDIGESTION Morphine  06/26/2017    Itching Patient stated Percocet [Oxycodone-acetaminophen]  06/26/2017    Itching Propafenone  06/18/2015    Other (comments) Nausea and pain Tapioca  12/28/2016    Nausea and Vomiting Wheat  12/28/2016    Other (comments) \" WANTS TO SLEEP 24 HOURS A DAY\" Current Immunizations  Never Reviewed No immunizations on file. Not reviewed this visit You Were Diagnosed With   
  
 Codes Comments Postoperative examination    -  Primary ICD-10-CM: Y92 ICD-9-CM: V67.00 Vitals BP Pulse Temp Resp Height(growth percentile) Weight(growth percentile) 132/80 (BP 1 Location: Left arm, BP Patient Position: Sitting) 66 98.3 °F (36.8 °C) (Oral) 14 5' 10\" (1.778 m) 168 lb 8 oz (76.4 kg) SpO2 BMI Smoking Status 98% 24.18 kg/m2 Never Smoker Vitals History BMI and BSA Data Body Mass Index Body Surface Area  
 24.18 kg/m 2 1.94 m 2 Preferred Pharmacy Pharmacy Name Phone Nasir Rodrigues [phone] Your Updated Medication List  
  
   
This list is accurate as of: 7/10/17 11:10 AM.  Always use your most recent med list.  
  
  
  
  
 aspirin delayed-release 81 mg tablet Take 1 Tab by mouth daily. calcium carbonate 200 mg calcium (500 mg) Chew Commonly known as:  TUMS Take 1 Tab by mouth as needed. HYDROcodone-acetaminophen 5-325 mg per tablet Commonly known as:  Jeannette Kaveh Take 1 Tab by mouth every four (4) hours as needed for Pain. Max Daily Amount: 6 Tabs. ondansetron 4 mg disintegrating tablet Commonly known as:  ZOFRAN ODT Take 1 Tab by mouth every eight (8) hours as needed for Nausea. Follow-up Instructions Return if symptoms worsen or fail to improve. Patient Instructions · May shower & bathe normally. · Continue with small bites & avoid dry meats. · May increase activities as tolerated--avoid heaving lifting >15# 4 more weeks. · Return PRN. Introducing [facility] [place] [facility]! [facility] introduces [employer] patient portal. Now you can access parts of your medical record, email your doctor's office, and request medication refills online. 1. In your internet browser, go to [email or web address] 2. Click on the First Time User? Click Here link in the Sign In box. You will see the New Member Sign Up page. 3. Enter your [employer] Access Code exactly as it appears below. You will not need to use this code after youve completed the sign-up process. If you do not sign up before the expiration date, you must request a new code. · [employer] Access Code: [email or web address] Expires: 9/2/2017  4:53 PM 
 
 4. Enter the last four digits of your Social Security Number (xxxx) and Date of Birth (mm/dd/yyyy) as indicated and click Submit. You will be taken to the next sign-up page. 5. Create a NaHere ID. This will be your NaHere login ID and cannot be changed, so think of one that is secure and easy to remember. 6. Create a NaHere password. You can change your password at any time. 7. Enter your Password Reset Question and Answer. This can be used at a later time if you forget your password. 8. Enter your e-mail address. You will receive e-mail notification when new information is available in [address] 9. Click Sign Up. You can now view and download portions of your medical record. 10. Click the Download Summary menu link to download a portable copy of your medical information. If you have questions, please visit the Frequently Asked Questions section of the NaHere website. Remember, NaHere is NOT to be used for urgent needs. For medical emergencies, dial 911. Now available from your iPhone and Android! Please provide this summary of care documentation to your next provider. Your primary care clinician is listed as Emily Melendez. If you have any questions after today's visit, please call [phone].

## 2017-07-10 NOTE — PATIENT INSTRUCTIONS
· May shower & bathe normally. · Continue with small bites & avoid dry meats. · May increase activities as tolerated--avoid heaving lifting >15# 4 more weeks. · Return PRN.

## 2017-07-13 ENCOUNTER — OFFICE VISIT (OUTPATIENT)
Dept: SURGERY | Age: 72
End: 2017-07-13

## 2017-07-13 VITALS
RESPIRATION RATE: 16 BRPM | TEMPERATURE: 98.7 F | BODY MASS INDEX: 24.05 KG/M2 | DIASTOLIC BLOOD PRESSURE: 80 MMHG | HEIGHT: 70 IN | WEIGHT: 168 LBS | OXYGEN SATURATION: 98 % | SYSTOLIC BLOOD PRESSURE: 140 MMHG | HEART RATE: 55 BPM

## 2017-07-13 DIAGNOSIS — Z09 POSTOPERATIVE EXAMINATION: Primary | ICD-10-CM

## 2017-07-13 NOTE — PROGRESS NOTES
1. Have you been to the ER, urgent care clinic since your last visit? Hospitalized since your last visit? No    2. Have you seen or consulted any other health care providers outside of the [address] [place] [address] since your last visit? Include any pap smears or colon screening.  No

## 2017-07-17 NOTE — PROGRESS NOTES
Subjective:      Amalia Banuelos is a 71 y.o. male presents for postop care 3 weeks following laparoscopic paraesophageal hernia repair. Appetite is good. Eating a regular diet without difficulty. Bowel movements are regular. The patient is voiding without difficulty. He notes a small mass beneath the scar of a right abdominal incision;; no drainage or cellulitis. Objective:     Visit Vitals    /80 (BP 1 Location: Right arm, BP Patient Position: Sitting)    Pulse (!) 55    Temp 98.7 °F (37.1 °C) (Oral)    Resp 16    Ht 5' 10\" (1.778 m)    Wt 168 lb (76.2 kg)    SpO2 98%    BMI 24.11 kg/m2       General:  alert, cooperative, no distress, appears stated age   Abdomen: soft, non-tender, no hernias   Incision:   palpable knot of absorbable suture at 12 mm site; no signs of infection     Assessment:     Doing well postoperatively. Plan:     1. Continue current medications. 2. Recommend PCP follow-up to discuss possible pacemaker for bradycardia. 3. Pt is to increase activities as tolerated.

## 2017-07-17 NOTE — PATIENT INSTRUCTIONS
Bradycardia: Care Instructions  Your Care Instructions    Bradycardia is a slow heart rate. If your heart beats too slowly, it can't supply your body with enough blood. This can make you weak or dizzy. Or it may make you pass out. Sometimes medicine can cause this problem. If this happens, your doctor may have you adjust one of your medicines. If a medicine is not the problem, your doctor may recommend a pacemaker. It is important to treat bradycardia so that you don't get more serious health problems. Your doctor will want to see you on a routine schedule to make sure that your heartbeat is normal.  Follow-up care is a key part of your treatment and safety. Be sure to make and go to all appointments, and call your doctor if you are having problems. It's also a good idea to know your test results and keep a list of the medicines you take. How can you care for yourself at home? · Take your medicines exactly as prescribed. Call your doctor if you think you are having a problem with your medicine. If your bradycardia is caused by another disease, your doctor will try to treat the disease. If it is caused by heart medicines, he or she will adjust your medicines. · Make lifestyle changes to improve your heart health. ¨ To control your cholesterol, avoid foods with a lot of fat, saturated fat, or sodium. Try to eat more fiber. And if your doctor says it's okay, get some exercise on most days. ¨ Do not smoke. Smoking can make your heart condition worse. If you need help quitting, talk to your doctor about stop-smoking programs and medicines. These can increase your chances of quitting for good. ¨ Limit alcohol to 2 drinks a day for men and 1 drink a day for women. Too much alcohol can cause health problems. Pacemaker  If you have a pacemaker, you will get more specific information about it. Be sure to:  · Check your pulse as your doctor tells you.   · Have your pacemaker checked as often as your doctor recommends. You may be able to do this over the phone or computer. · Avoid strong magnetic or electrical fields. These include wand metal detectors used in airports, MRIs, welding equipment, and generators. · You will be checked several times right after you get your pacemaker and when it is time to have the battery changed. Batteries last for 5 to 15 years. · You can talk on a cell phone. But keep it 6 inches away from your pacemaker. · Microwaves, TVs, radios, and kitchen and bathroom appliances won't harm you. When should you call for help? Call 911 anytime you think you may need emergency care. For example, call if:  · You passed out (lost consciousness). · You have symptoms of a heart attack. These may include:  ¨ Chest pain or pressure, or a strange feeling in the chest.  ¨ Sweating. ¨ Shortness of breath. ¨ Nausea or vomiting. ¨ Pain, pressure, or a strange feeling in the back, neck, jaw, or upper belly or in one or both shoulders or arms. ¨ Lightheadedness or sudden weakness. ¨ A fast or irregular heartbeat. After you call 911, the  may tell you to chew 1 adult-strength or 2 to 4 low-dose aspirin. Wait for an ambulance. Do not try to drive yourself. · You have symptoms of a stroke. These may include:  ¨ Sudden numbness, tingling, weakness, or loss of movement in your face, arm, or leg, especially on only one side of your body. ¨ Sudden vision changes. ¨ Sudden trouble speaking. ¨ Sudden confusion or trouble understanding simple statements. ¨ Sudden problems with walking or balance. ¨ A sudden, severe headache that is different from past headaches. Call your doctor now or seek immediate medical care if:  · You are dizzy or lightheaded, or you feel like you may faint. · You have new or increased shortness of breath. · You had a pacemaker implanted and you have signs of infection, such as:  ¨ Increased pain, swelling, warmth, or redness.   ¨ Red streaks leading from the cut (incision). ¨ Pus draining from the incision. ¨ A fever. Watch closely for changes in your health, and be sure to contact your doctor if you have any problems. Where can you learn more? Go to http://kallie-audrey.info/. Enter V978 in the search box to learn more about \"Bradycardia: Care Instructions. \"  Current as of: April 3, 2017  Content Version: 11.3  © 3604-0478 [employer]. Care instructions adapted under license by [employer] (which disclaims liability or warranty for this information). If you have questions about a medical condition or this instruction, always ask your healthcare professional. [address] any warranty or liability for your use of this information.

## 2017-09-13 ENCOUNTER — HOSPITAL ENCOUNTER (OUTPATIENT)
Dept: LAB | Age: 72
Discharge: HOME | End: 2017-09-13

## 2017-12-08 ENCOUNTER — TELEPHONE (OUTPATIENT)
Dept: SURGERY | Age: 72
End: 2017-12-08

## 2017-12-08 NOTE — TELEPHONE ENCOUNTER
Pt said he has been sick when he eats and it has been that way since his hernia surgery, he said he is having pain in his left side of abdomen where his belt would fit and his pelvic area, he said he went to ortho MD did a Cortisone injection into his left pelvic area and it has not helped and he is  having a MRI Tuesday of his abdomen and left hip, he said all this started after his hernia surgery and he said he is having nausea now every time he eats. He has a lot of questions and would like for dr Holly Quinn to pilar him back. I told him I will forward this message to him but he probably wont call back until Monday. Pt in agreement.

## 2017-12-08 NOTE — TELEPHONE ENCOUNTER
Patient has general questions and would like to speak to Dr. Gregorio Amezcua nurse about his hernia repair surgery he had done by Dr. Elana Starr back in June.

## 2017-12-11 NOTE — TELEPHONE ENCOUNTER
I called the patient and I let him know that dr Laury Cuellar wants to see him in the office instead of trying to discuss his problems over the phone, I have transferred him to the front office to make his appointment. Pt in agreement.

## 2017-12-12 ENCOUNTER — HOSPITAL ENCOUNTER (OUTPATIENT)
Dept: MRI IMAGING | Age: 72
Discharge: HOME | End: 2017-12-12
Attending: ORTHOPAEDIC SURGERY
Payer: MEDICARE

## 2017-12-12 DIAGNOSIS — M25.552 LEFT HIP PAIN: ICD-10-CM

## 2017-12-12 PROCEDURE — 73721 MRI JNT OF LWR EXTRE W/O DYE: CPT

## 2017-12-19 ENCOUNTER — OFFICE VISIT (OUTPATIENT)
Dept: SURGERY | Age: 72
End: 2017-12-19

## 2017-12-19 VITALS
RESPIRATION RATE: 20 BRPM | TEMPERATURE: 98.4 F | BODY MASS INDEX: 24.48 KG/M2 | OXYGEN SATURATION: 98 % | HEIGHT: 70 IN | WEIGHT: 171 LBS | SYSTOLIC BLOOD PRESSURE: 153 MMHG | HEART RATE: 63 BPM | DIASTOLIC BLOOD PRESSURE: 78 MMHG

## 2017-12-19 DIAGNOSIS — K21.9 GASTROESOPHAGEAL REFLUX DISEASE WITHOUT ESOPHAGITIS: ICD-10-CM

## 2017-12-19 DIAGNOSIS — R11.0 NAUSEA: Primary | ICD-10-CM

## 2017-12-19 NOTE — PROGRESS NOTES
1. Have you been to the ER, urgent care clinic since your last visit? Hospitalized since your last visit? No    2. Have you seen or consulted any other health care providers outside of the [address] [place] [address] since your last visit? Include any pap smears or colon screening.  Yes Ortho, Hip specialist

## 2017-12-20 NOTE — PATIENT INSTRUCTIONS
Nausea and Vomiting: Care Instructions  Your Care Instructions    When you are nauseated, you may feel weak and sweaty and notice a lot of saliva in your mouth. Nausea often leads to vomiting. Most of the time you do not need to worry about nausea and vomiting, but they can be signs of other illnesses. Two common causes of nausea and vomiting are stomach flu and food poisoning. Nausea and vomiting from viral stomach flu will usually start to improve within 24 hours. Nausea and vomiting from food poisoning may last from 12 to 48 hours. The doctor has checked you carefully, but problems can develop later. If you notice any problems or new symptoms, get medical treatment right away. Follow-up care is a key part of your treatment and safety. Be sure to make and go to all appointments, and call your doctor if you are having problems. It's also a good idea to know your test results and keep a list of the medicines you take. How can you care for yourself at home? · To prevent dehydration, drink plenty of fluids, enough so that your urine is light yellow or clear like water. Choose water and other caffeine-free clear liquids until you feel better. If you have kidney, heart, or liver disease and have to limit fluids, talk with your doctor before you increase the amount of fluids you drink. · Rest in bed until you feel better. · When you are able to eat, try clear soups, mild foods, and liquids until all symptoms are gone for 12 to 48 hours. Other good choices include dry toast, crackers, cooked cereal, and gelatin dessert, such as Jell-O. When should you call for help? Call 911 anytime you think you may need emergency care. For example, call if:  ? · You passed out (lost consciousness). ?Call your doctor now or seek immediate medical care if:  ? · You have symptoms of dehydration, such as:  ¨ Dry eyes and a dry mouth. ¨ Passing only a little dark urine.   ¨ Feeling thirstier than usual.   ? · You have new or worsening belly pain. ? · You have a new or higher fever. ? · You vomit blood or what looks like coffee grounds. ? Watch closely for changes in your health, and be sure to contact your doctor if:  ? · You have ongoing nausea and vomiting. ? · Your vomiting is getting worse. ? · Your vomiting lasts longer than 2 days. ? · You are not getting better as expected. Where can you learn more? Go to http://kallie-audrey.info/. Enter 25 [id number] in the search box to learn more about \"Nausea and Vomiting: Care Instructions. \"  Current as of: March 20, 2017  Content Version: 11.4  © 2044-9620 [employer]. Care instructions adapted under license by [employer] (which disclaims liability or warranty for this information). If you have questions about a medical condition or this instruction, always ask your healthcare professional. [address] any warranty or liability for your use of this information.

## 2017-12-22 ENCOUNTER — HOSPITAL ENCOUNTER (OUTPATIENT)
Dept: GENERAL RADIOLOGY | Age: 72
Discharge: HOME | End: 2017-12-22
Attending: SURGERY
Payer: MEDICARE

## 2017-12-22 DIAGNOSIS — K21.9 GASTROESOPHAGEAL REFLUX DISEASE WITHOUT ESOPHAGITIS: ICD-10-CM

## 2017-12-22 PROCEDURE — 74241 XR UPPER GI SERIES W KUB: CPT

## 2017-12-22 NOTE — PROGRESS NOTES
Subjective:      Will Banuelos is a 71 y.o. male presents for postop care 6 months following laparoscopic PEH repair. Appetite is fair. Eating a regular diet with near-constant nausea (no emesis). Bowel movements are regular. The patient is voiding without difficulty. The patient complains of left hip pain since surgery (undergoing Ortho evaluation). Objective:     Visit Vitals    /78    Pulse 63    Temp 98.4 °F (36.9 °C)    Resp 20    Ht 5' 10\" (1.778 m)    Wt 171 lb (77.6 kg)    SpO2 98%    BMI 24.54 kg/m2       General:  alert, cooperative, no distress, appears stated age   Abdomen: deferred   Incision:   deferred     Assessment:     Nausea following paraesophageal hernia repair; may represent recurrence vs gastroparesis. Plan:     1. Continue current medications. 2. UGI, gastric emptying study ordered. 3. Follow-up with Dr. Carolyn Wilson re: hip pain, recent MRI. Cherelle Steinberg

## 2017-12-27 ENCOUNTER — TELEPHONE (OUTPATIENT)
Dept: SURGERY | Age: 72
End: 2017-12-27

## 2017-12-27 NOTE — TELEPHONE ENCOUNTER
Patient came in office explaining that the radioactive drink he needs to ingest for his test will make him sick. Patient will need to reschedule. Was told to check with nurse before talking to Dominic Marquesor about rescheduling.

## 2017-12-27 NOTE — TELEPHONE ENCOUNTER
I called the patient and he said he was constipated which was causing him nausea so he said he had to cancel his test that was scheduled for today. He said he will take a laxative to get his bowels working. I asked him if he had any nausea medication and he said he has Zofran. ROOPA childress said he was calling to reschedule his test. I told him I will forward to [facility] our  and he will call him to reschedule his gastric emptying study. Pt in agreement.

## 2018-01-11 ENCOUNTER — HOSPITAL ENCOUNTER (OUTPATIENT)
Dept: NUCLEAR MEDICINE | Age: 73
Discharge: HOME | End: 2018-01-11
Attending: SURGERY
Payer: MEDICARE

## 2018-01-11 DIAGNOSIS — R11.0 NAUSEA: ICD-10-CM

## 2018-01-11 PROCEDURE — 78264 GASTRIC EMPTYING IMG STUDY: CPT

## 2018-01-16 ENCOUNTER — OFFICE VISIT (OUTPATIENT)
Dept: SURGERY | Age: 73
End: 2018-01-16

## 2018-01-16 VITALS
RESPIRATION RATE: 20 BRPM | BODY MASS INDEX: 24.77 KG/M2 | HEIGHT: 70 IN | DIASTOLIC BLOOD PRESSURE: 70 MMHG | SYSTOLIC BLOOD PRESSURE: 140 MMHG | HEART RATE: 66 BPM | OXYGEN SATURATION: 98 % | WEIGHT: 173 LBS | TEMPERATURE: 98.7 F

## 2018-01-16 DIAGNOSIS — R11.0 NAUSEA: Primary | ICD-10-CM

## 2018-01-16 RX ORDER — PROMETHAZINE HYDROCHLORIDE 25 MG/1
25 TABLET ORAL
Qty: 20 TAB | Refills: 1 | Status: SHIPPED | OUTPATIENT
Start: 2018-01-16 | End: 2020-02-21

## 2018-01-16 NOTE — PROGRESS NOTES
1. Have you been to the ER, urgent care clinic since your last visit? Hospitalized since your last visit? No    2. Have you seen or consulted any other health care providers outside of the [address] [place] [address] since your last visit? Include any pap smears or colon screening. PCP being sent for a EGD and Colonoscopy soon, Pt having constant nausea and vomits sometimes with food.

## 2018-01-18 NOTE — PATIENT INSTRUCTIONS
Nausea and Vomiting: Care Instructions  Your Care Instructions    When you are nauseated, you may feel weak and sweaty and notice a lot of saliva in your mouth. Nausea often leads to vomiting. Most of the time you do not need to worry about nausea and vomiting, but they can be signs of other illnesses. Two common causes of nausea and vomiting are stomach flu and food poisoning. Nausea and vomiting from viral stomach flu will usually start to improve within 24 hours. Nausea and vomiting from food poisoning may last from 12 to 48 hours. The doctor has checked you carefully, but problems can develop later. If you notice any problems or new symptoms, get medical treatment right away. Follow-up care is a key part of your treatment and safety. Be sure to make and go to all appointments, and call your doctor if you are having problems. It's also a good idea to know your test results and keep a list of the medicines you take. How can you care for yourself at home? · To prevent dehydration, drink plenty of fluids, enough so that your urine is light yellow or clear like water. Choose water and other caffeine-free clear liquids until you feel better. If you have kidney, heart, or liver disease and have to limit fluids, talk with your doctor before you increase the amount of fluids you drink. · Rest in bed until you feel better. · When you are able to eat, try clear soups, mild foods, and liquids until all symptoms are gone for 12 to 48 hours. Other good choices include dry toast, crackers, cooked cereal, and gelatin dessert, such as Jell-O. When should you call for help? Call 911 anytime you think you may need emergency care. For example, call if:  ? · You passed out (lost consciousness). ?Call your doctor now or seek immediate medical care if:  ? · You have symptoms of dehydration, such as:  ¨ Dry eyes and a dry mouth. ¨ Passing only a little dark urine.   ¨ Feeling thirstier than usual.   ? · You have new or worsening belly pain. ? · You have a new or higher fever. ? · You vomit blood or what looks like coffee grounds. ? Watch closely for changes in your health, and be sure to contact your doctor if:  ? · You have ongoing nausea and vomiting. ? · Your vomiting is getting worse. ? · Your vomiting lasts longer than 2 days. ? · You are not getting better as expected. Where can you learn more? Go to http://kallie-audrey.info/. Enter 25 [id number] in the search box to learn more about \"Nausea and Vomiting: Care Instructions. \"  Current as of: March 20, 2017  Content Version: 11.4  © 9994-6325 [employer]. Care instructions adapted under license by [employer] (which disclaims liability or warranty for this information). If you have questions about a medical condition or this instruction, always ask your healthcare professional. [address] any warranty or liability for your use of this information.

## 2018-01-18 NOTE — PROGRESS NOTES
Subjective:      Silvana Banuelos is a 71 y.o. male presents for postop care 7 months following laparoscopic paraesophageal hernia repair with cholecystectomy. Appetite is fair. Eating a regular diet with ongoing nausea. Bowel movements are regular. The patient is voiding without difficulty. The patient is not having any pain. He denies emesis or GERD symptoms. Objective:     Visit Vitals    /70    Pulse 66    Temp 98.7 °F (37.1 °C)    Resp 20    Ht 5' 10\" (1.778 m)    Wt 173 lb (78.5 kg)    SpO2 98%    BMI 24.82 kg/m2       General:  alert, cooperative, no distress, appears stated age   Abdomen: deferred   Incision:   deferred     Assessment:     Nausea following PEH repair/cholecystectomy. Small recurrent hernia on UGI (no reflux); normal NM gastric emptying study. Plan:     1. Continue current medications. Phenergan prescribed. 2. Diet as tolerated. 3. Agree with plans for upper endoscopy.

## 2018-03-16 ENCOUNTER — TELEPHONE (OUTPATIENT)
Dept: SURGERY | Age: 73
End: 2018-03-16

## 2018-03-16 NOTE — TELEPHONE ENCOUNTER
Patient showed up to office today and wants the suture material that Dr Bryon Colunga used in him during his surgery. He said he is going to see a pain specialist on Friday and he saw a Dr Marjie Kawasaki and said everything that Dr Bryon Colunga did looked real good. I told the patient I did not have what Dr Bryon Colunga used on him in surgery and I will have to talk to Dr Bryon Colunga on Tuesday to get it from him if he is willing to provide. He said he thinks he is allergic to the suture material and he know someone that can look at it and see what all is in it. Again I told the patient I will speak to Dr Bryon Colunga about this next week.

## 2018-03-16 NOTE — TELEPHONE ENCOUNTER
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

## 2018-07-31 ENCOUNTER — HOSPITAL ENCOUNTER (OUTPATIENT)
Dept: MRI IMAGING | Age: 73
Discharge: HOME | End: 2018-07-31
Attending: ORTHOPAEDIC SURGERY
Payer: MEDICARE

## 2018-07-31 DIAGNOSIS — M75.101 ROTATOR CUFF SYNDROME OF RIGHT SHOULDER: ICD-10-CM

## 2018-07-31 DIAGNOSIS — S46.211A TRAUMATIC PARTIAL TEAR OF RIGHT BICEPS TENDON: ICD-10-CM

## 2018-07-31 PROCEDURE — 73221 MRI JOINT UPR EXTREM W/O DYE: CPT

## 2019-05-08 ENCOUNTER — HOSPITAL ENCOUNTER (OUTPATIENT)
Dept: MRI IMAGING | Age: 74
Discharge: HOME | End: 2019-05-08
Payer: MEDICARE

## 2019-05-08 DIAGNOSIS — M54.32 LEFT SIDED SCIATICA: ICD-10-CM

## 2019-05-08 PROCEDURE — 72148 MRI LUMBAR SPINE W/O DYE: CPT

## 2019-12-04 ENCOUNTER — HOSPITAL ENCOUNTER (OUTPATIENT)
Dept: MRI IMAGING | Age: 74
Discharge: HOME | End: 2019-12-04
Attending: ORTHOPAEDIC SURGERY
Payer: MEDICARE

## 2019-12-04 DIAGNOSIS — M75.42 IMPINGEMENT SYNDROME OF LEFT SHOULDER: ICD-10-CM

## 2019-12-04 PROCEDURE — 73221 MRI JOINT UPR EXTREM W/O DYE: CPT

## 2020-02-21 ENCOUNTER — HOSPITAL ENCOUNTER (OUTPATIENT)
Dept: PREADMISSION TESTING | Age: 75
Discharge: HOME | End: 2020-02-21
Payer: MEDICARE

## 2020-02-21 VITALS
HEART RATE: 63 BPM | HEIGHT: 70 IN | BODY MASS INDEX: 26.27 KG/M2 | TEMPERATURE: 98.7 F | SYSTOLIC BLOOD PRESSURE: 142 MMHG | WEIGHT: 183.5 LBS | DIASTOLIC BLOOD PRESSURE: 77 MMHG

## 2020-02-21 LAB
ATRIAL RATE: 58 BPM
CALCULATED P AXIS, ECG09: 51 DEGREES
CALCULATED R AXIS, ECG10: -21 DEGREES
CALCULATED T AXIS, ECG11: 44 DEGREES
DIAGNOSIS, 93000: NORMAL
HGB BLD-MCNC: 14.9 G/DL (ref 12.1–17)
P-R INTERVAL, ECG05: 156 MS
Q-T INTERVAL, ECG07: 460 MS
QRS DURATION, ECG06: 96 MS
QTC CALCULATION (BEZET), ECG08: 451 MS
VENTRICULAR RATE, ECG03: 58 BPM

## 2020-02-21 PROCEDURE — 85018 HEMOGLOBIN: CPT

## 2020-02-21 PROCEDURE — 36415 COLL VENOUS BLD VENIPUNCTURE: CPT

## 2020-02-21 PROCEDURE — 93005 ELECTROCARDIOGRAM TRACING: CPT

## 2020-02-21 NOTE — PERIOP NOTES
Patient given surgical site infection information FAQs handout and hand hygiene tips sheet. Pre-operative instructions reviewed and patient verbalizes understanding of instructions. Patient has been given the opportunity to ask additional questions. PT ADVISED TO NOT EAT SOLID FOODS AFTER MIDNIGHT THE NIGHT BEFORE SURGERY INCLUDING CANDY,MINTS OR GUM. DO NOT DRINK ALCOHOL 24 HOURS BEFORE SURGERY. PT MAY DRINK CLEAR LIQUIDS FROM 12 MIDNIGHT UNTIL 1 HOUR PRIOR TO ARRIVAL. PT ALLERGIC TO CHG SO NO SOAP GIVEN.

## 2020-02-26 PROBLEM — S83.242A TEAR OF MEDIAL MENISCUS OF LEFT KNEE, CURRENT: Status: ACTIVE | Noted: 2020-02-26

## 2020-02-26 NOTE — H&P
Date of Surgery Update:  Marilyn Banuelos was seen and examined. Past Medical History:   Diagnosis Date    Adverse effect of anesthesia     PT STATES \" I HAD VERY LOW BLOOD PRESSURE FOR A FEW WEEKS FOLLOWING SINUS SURGERY\"    Anemia     Arrhythmia     OF UNKNOWN TYPE PER PATIENT, PT STOPPED GOING TO CARDIOLOGIST    Arthritis     Asthma     AS A CHILD    Autoimmune disease ([facility] [address]     CHRONIC FATIGUE    CFS (chronic fatigue syndrome)     Contact dermatitis and other eczema, due to unspecified cause     GERD (gastroesophageal reflux disease)     Hiatal hernia     Rotator cuff disorder     right     Prior to Admission Medications   Prescriptions Last Dose Informant Patient Reported? Taking?   calcium carbonate (TUMS) 200 mg calcium (500 mg) chew 3/1/2020 at 1200  Yes Yes   Sig: Take 1 Tab by mouth as needed. Facility-Administered Medications: None      Allergy to:Latex; Sulfa (sulfonamide antibiotics); Broccoli; Chlorhexidine; Diltiazem; Gluten; Ibuprofen; Lactose; Morphine; Percocet [oxycodone-acetaminophen]; Propafenone; Tapioca; and Wheat  Physical Examination: General appearance - alert, well appearing, and in no distress  Chest - clear to auscultation, no wheezes, rales or rhonchi, symmetric air entry  Heart - normal rate and regular rhythm  Abdomen - soft, nontender, nondistended, no masses or organomegaly  History and physical has been reviewed. The patient has been examined.  There have been no significant clinical changes since the completion of the originally dated History and Physical.    Signed By: Dominic Disla PA-C     March 2, 2020 7:17 AM             PCP: Katlyn Castanon MD      Problem List      None        Subjective:          Patient Active Problem List   Diagnosis    S/P rotator cuff repair    Chronic right shoulder pain    Impingement syndrome of left shoulder    DVT (deep venous thrombosis)    Bradycardia    Abnormal EKG    Biliary dyskinesia     Chief Complaint: Follow-up of the Left Knee    HPI: Fidelia Banuelos is a 69 y.o. male who returns for follow-up of his left knee pain. We previously ordered an MRI of his left knee to evaluate for a medial meniscus tear after he failed to respond to conservative management. He returns today to discuss the results. He notes continued left knee pain localized to the medial joint line, however, he states the pain has been less severe in clinic today. He reports continued left shoulder pain as well that interferes with his sleep. He describes the pain as severe. He is not ambulating with any assistance today.     Objective:      There were no vitals filed for this visit. Height: 5' 10\"       Wt Readings from Last 3 Encounters:   20 170 lb   20 170 lb   19 170 lb     Body mass index is 24.39 kg/m².     Musculoskeletal : Left Knee: He has normal range of motion of the left knee with no significant pain on motion. Ligaments intact. No effusion. Negative patellofemoral grind. Positive Emerson's sign medially. Strong pedal pulses. No pedal edema. Skin healthy and intact. No apparent atrophy.  Leg lengths appear equal.     Radiographs:             Mri Knee Left Without Contrast ([id number])     Result Date: 2020     Emily [address]       Name: Claudia Khloe  [address]: Sigifredo Perrin              : 1945   Age: 69       Sex: Doreene Oxford: [de-identified]  Loc: H.MRI      Phone #: [phone]        Exam Date: 2020 Status: DEP CLI    FAX #: [phone]        Rad No:             URN: [id number]                                   Unit No: [id number]         EXAMS:                                                                [id number] MRI LOW EXT JNT LT WO IV CON                                              Reason for Visit: UNILATERAL PRIMARY OSTEOARTHRITIS, LEFT       Reason for Exam: OSTEO       EXAMINATION: Left knee MRI without contrast               HISTORY: Left knee medial meniscus tear               TECHNIQUE: Multiplanar multisequence MR examination of the left knee       is performed without intravenous contrast.               FINDINGS: Comparison is made with left knee radiographs dated       4/11/2019. In the medial compartment, there is a complex tear of the posterior       horn of the medial meniscus involving radial and longitudinal       components. There is likely also a horizontal undersurface tear at the       junction of the body and posterior horn. There is minimal partial       thickness chondrosis of the mesial aspect of the central weightbearing       medial femoral condyle. There is no subchondral edema. In the lateral compartment, the meniscus is intact. There is no focal       chondrosis or subchondral edema. In the patellofemoral compartment, there is partial thickness       chondrosis of the median ridge of the patella extending to the medial       and lateral patellar facets. There is mild subchondral edema in the       superior median ridge. The cruciate and collateral ligaments are intact. There is a small       amount of fluid between the iliotibial band and the lateral femoral       condyle. The extensor mechanism is intact. There is a small joint       effusion and Baker's cyst. There is mild pes anserine bursitis. There       are no marrow replacing lesions. IMPRESSION:          1. Complex tear of the posterior horn of the medial meniscus with         minimal medial compartment chondrosis. 2. Mild patellofemoral compartment left knee chondrosis. 3. Small amount of fluid between the iliotibial band and lateral         femoral condyle, which can be seen in the setting of iliotibial band         friction syndrome.          4. Small joint effusion and Baker's cyst. There is mild pes anserine bursitis. PAGE  1                       Signed Report                    (CONTINUED)     Emily [address] 83       Name: Khari Lopez [facility]       Phys: Alecia Perrin              : 1945   Age: 69       Sex: Robert Justyn: [de-identified]  Loc: H.MRI      Phone #: [phone]        Exam Date: 2020 Status: DEP CLI    FAX #: [phone]        Rad No:             URN: [id number]                                   Unit No: [id number]         EXAMS:                                                                [id number] MRI LOW EXT JNT LT WO IV CON                                 <Continued>               ** Electronically Signed by Josh Kim MD **          **               on 2020 at 1451              **                      Reported and signed by: Josh Kim MD                                   CC:                                                                                                                                         Dictated Date/Time: 2020 (0241) Techs: Collin Quigley RT-MR                                            Transcribed Date/Time: 2020 (1443) : Jordan Burroughs Print D/T: S: 2020 (1031)                                                             BATCH NO: N/A          PAGE  2                       Signed Report     Assessment:      1. Primary osteoarthritis of left knee    2. Tear of medial meniscus of left knee, current, unspecified tear type, initial encounter      Plan:      I reviewed the MRI ordered of the left knee with the patient. He has mild patellofemoral joint osteoarthritis and a medial meniscus tear. I discussed these diagnoses with the patient as well as treatment options.  We discussed arthroscopic medial meniscectomy vs continued conservative management as well as the pros and cons of each. I am confident that arthroscopy will provide him relief based on his minimal osteoarthritis. We discussed arthroscopic partial medial meniscectomy at length including expected outcomes and post-op recovery as well as risks and complications, specifically DVT, PE, and infection After a lengthy discussion, he desires to proceed with surgery. We will schedule surgery at his convenience. Follow-up for scheduled surgery.     No orders of the defined types were placed in this encounter. Return for Scheduled surgery.      Medical documentation was entered by me, [address] [facility] for Dr. Mike Padgett. 2/19/2020     I, Marco Alex MD, personally, performed the services described in this documentation, as scribed in my presence, and it is both accurate and complete.

## 2020-02-28 ENCOUNTER — ANESTHESIA EVENT (OUTPATIENT)
Dept: SURGERY | Age: 75
End: 2020-02-28
Payer: MEDICARE

## 2020-03-02 ENCOUNTER — HOSPITAL ENCOUNTER (OUTPATIENT)
Age: 75
Setting detail: OUTPATIENT SURGERY
Discharge: HOME | End: 2020-03-02
Attending: ORTHOPAEDIC SURGERY | Admitting: ORTHOPAEDIC SURGERY
Payer: MEDICARE

## 2020-03-02 ENCOUNTER — ANESTHESIA (OUTPATIENT)
Dept: SURGERY | Age: 75
End: 2020-03-02
Payer: MEDICARE

## 2020-03-02 VITALS
DIASTOLIC BLOOD PRESSURE: 80 MMHG | OXYGEN SATURATION: 94 % | TEMPERATURE: 97.1 F | WEIGHT: 183 LBS | SYSTOLIC BLOOD PRESSURE: 143 MMHG | RESPIRATION RATE: 15 BRPM | BODY MASS INDEX: 26.26 KG/M2 | HEART RATE: 71 BPM

## 2020-03-02 DIAGNOSIS — S83.242S TEAR OF MEDIAL MENISCUS OF LEFT KNEE, CURRENT, UNSPECIFIED TEAR TYPE, SEQUELA: Primary | ICD-10-CM

## 2020-03-02 PROCEDURE — 74011250636 HC RX REV CODE- 250/636: Performed by: ANESTHESIOLOGY

## 2020-03-02 PROCEDURE — 74011000250 HC RX REV CODE- 250: Performed by: ORTHOPAEDIC SURGERY

## 2020-03-02 PROCEDURE — 76060000032 HC ANESTHESIA 0.5 TO 1 HR: Performed by: ORTHOPAEDIC SURGERY

## 2020-03-02 PROCEDURE — 77030002916 HC SUT ETHLN J&J -A: Performed by: ORTHOPAEDIC SURGERY

## 2020-03-02 PROCEDURE — 77030010509 HC AIRWY LMA MSK TELE -A: Performed by: ANESTHESIOLOGY

## 2020-03-02 PROCEDURE — 74011250636 HC RX REV CODE- 250/636: Performed by: NURSE ANESTHETIST, CERTIFIED REGISTERED

## 2020-03-02 PROCEDURE — 77030006884 HC BLD SHV INCIS S&N -B: Performed by: ORTHOPAEDIC SURGERY

## 2020-03-02 PROCEDURE — 74011250636 HC RX REV CODE- 250/636: Performed by: ORTHOPAEDIC SURGERY

## 2020-03-02 PROCEDURE — 76210000021 HC REC RM PH II 0.5 TO 1 HR: Performed by: ORTHOPAEDIC SURGERY

## 2020-03-02 PROCEDURE — 77030000032 HC CUF TRNQT ZIMM -B: Performed by: ORTHOPAEDIC SURGERY

## 2020-03-02 PROCEDURE — 77030028224 HC PDNG CST BSNM -A: Performed by: ORTHOPAEDIC SURGERY

## 2020-03-02 PROCEDURE — 74011000250 HC RX REV CODE- 250: Performed by: ANESTHESIOLOGY

## 2020-03-02 PROCEDURE — 74011000250 HC RX REV CODE- 250: Performed by: NURSE ANESTHETIST, CERTIFIED REGISTERED

## 2020-03-02 PROCEDURE — 76010000138 HC OR TIME 0.5 TO 1 HR: Performed by: ORTHOPAEDIC SURGERY

## 2020-03-02 PROCEDURE — 77030018835 HC SOL IRR LR ICUM -A: Performed by: ORTHOPAEDIC SURGERY

## 2020-03-02 PROCEDURE — 74011250637 HC RX REV CODE- 250/637: Performed by: PHYSICIAN ASSISTANT

## 2020-03-02 PROCEDURE — 76210000006 HC OR PH I REC 0.5 TO 1 HR: Performed by: ORTHOPAEDIC SURGERY

## 2020-03-02 PROCEDURE — 77030008496 HC TBNG ARTHSC IRR S&N -B: Performed by: ORTHOPAEDIC SURGERY

## 2020-03-02 RX ORDER — ACETAMINOPHEN 325 MG/1
650 TABLET ORAL ONCE
Status: DISCONTINUED | OUTPATIENT
Start: 2020-03-02 | End: 2020-03-02 | Stop reason: HOSPADM

## 2020-03-02 RX ORDER — FENTANYL CITRATE 50 UG/ML
INJECTION, SOLUTION INTRAMUSCULAR; INTRAVENOUS AS NEEDED
Status: DISCONTINUED | OUTPATIENT
Start: 2020-03-02 | End: 2020-03-02 | Stop reason: HOSPADM

## 2020-03-02 RX ORDER — ROPIVACAINE HYDROCHLORIDE 5 MG/ML
INJECTION, SOLUTION EPIDURAL; INFILTRATION; PERINEURAL AS NEEDED
Status: DISCONTINUED | OUTPATIENT
Start: 2020-03-02 | End: 2020-03-02 | Stop reason: HOSPADM

## 2020-03-02 RX ORDER — FENTANYL CITRATE 50 UG/ML
50 INJECTION, SOLUTION INTRAMUSCULAR; INTRAVENOUS AS NEEDED
Status: DISCONTINUED | OUTPATIENT
Start: 2020-03-02 | End: 2020-03-02 | Stop reason: HOSPADM

## 2020-03-02 RX ORDER — ONDANSETRON 2 MG/ML
4 INJECTION INTRAMUSCULAR; INTRAVENOUS AS NEEDED
Status: DISCONTINUED | OUTPATIENT
Start: 2020-03-02 | End: 2020-03-02 | Stop reason: HOSPADM

## 2020-03-02 RX ORDER — LIDOCAINE HYDROCHLORIDE AND EPINEPHRINE 10; 10 MG/ML; UG/ML
INJECTION, SOLUTION INFILTRATION; PERINEURAL AS NEEDED
Status: DISCONTINUED | OUTPATIENT
Start: 2020-03-02 | End: 2020-03-02 | Stop reason: HOSPADM

## 2020-03-02 RX ORDER — GUAIFENESIN 1200 MG
650 TABLET, EXTENDED RELEASE 12 HR ORAL
Qty: 120 CAP | Refills: 0 | Status: SHIPPED
Start: 2020-03-02 | End: 2021-07-01

## 2020-03-02 RX ORDER — SODIUM CHLORIDE 0.9 % (FLUSH) 0.9 %
5-40 SYRINGE (ML) INJECTION AS NEEDED
Status: DISCONTINUED | OUTPATIENT
Start: 2020-03-02 | End: 2020-03-02 | Stop reason: HOSPADM

## 2020-03-02 RX ORDER — OXYCODONE AND ACETAMINOPHEN 5; 325 MG/1; MG/1
1 TABLET ORAL AS NEEDED
Status: DISCONTINUED | OUTPATIENT
Start: 2020-03-02 | End: 2020-03-02 | Stop reason: HOSPADM

## 2020-03-02 RX ORDER — SODIUM CHLORIDE 9 MG/ML
25 INJECTION, SOLUTION INTRAVENOUS CONTINUOUS
Status: DISCONTINUED | OUTPATIENT
Start: 2020-03-02 | End: 2020-03-02 | Stop reason: HOSPADM

## 2020-03-02 RX ORDER — TRAMADOL HYDROCHLORIDE 50 MG/1
50 TABLET ORAL
Status: DISCONTINUED | OUTPATIENT
Start: 2020-03-02 | End: 2020-03-02 | Stop reason: HOSPADM

## 2020-03-02 RX ORDER — SODIUM CHLORIDE 0.9 % (FLUSH) 0.9 %
5-40 SYRINGE (ML) INJECTION EVERY 8 HOURS
Status: DISCONTINUED | OUTPATIENT
Start: 2020-03-02 | End: 2020-03-02 | Stop reason: HOSPADM

## 2020-03-02 RX ORDER — MORPHINE SULFATE 10 MG/ML
2 INJECTION, SOLUTION INTRAMUSCULAR; INTRAVENOUS
Status: DISCONTINUED | OUTPATIENT
Start: 2020-03-02 | End: 2020-03-02 | Stop reason: HOSPADM

## 2020-03-02 RX ORDER — PROPOFOL 10 MG/ML
INJECTION, EMULSION INTRAVENOUS AS NEEDED
Status: DISCONTINUED | OUTPATIENT
Start: 2020-03-02 | End: 2020-03-02 | Stop reason: HOSPADM

## 2020-03-02 RX ORDER — HYDROMORPHONE HYDROCHLORIDE 1 MG/ML
0.2 INJECTION, SOLUTION INTRAMUSCULAR; INTRAVENOUS; SUBCUTANEOUS
Status: DISCONTINUED | OUTPATIENT
Start: 2020-03-02 | End: 2020-03-02 | Stop reason: HOSPADM

## 2020-03-02 RX ORDER — MIDAZOLAM HYDROCHLORIDE 1 MG/ML
0.5 INJECTION, SOLUTION INTRAMUSCULAR; INTRAVENOUS
Status: DISCONTINUED | OUTPATIENT
Start: 2020-03-02 | End: 2020-03-02 | Stop reason: HOSPADM

## 2020-03-02 RX ORDER — DIPHENHYDRAMINE HYDROCHLORIDE 50 MG/ML
12.5 INJECTION, SOLUTION INTRAMUSCULAR; INTRAVENOUS
Status: DISCONTINUED | OUTPATIENT
Start: 2020-03-02 | End: 2020-03-02 | Stop reason: HOSPADM

## 2020-03-02 RX ORDER — DIPHENHYDRAMINE HYDROCHLORIDE 50 MG/ML
12.5 INJECTION, SOLUTION INTRAMUSCULAR; INTRAVENOUS AS NEEDED
Status: DISCONTINUED | OUTPATIENT
Start: 2020-03-02 | End: 2020-03-02 | Stop reason: HOSPADM

## 2020-03-02 RX ORDER — SODIUM CHLORIDE, SODIUM LACTATE, POTASSIUM CHLORIDE, CALCIUM CHLORIDE 600; 310; 30; 20 MG/100ML; MG/100ML; MG/100ML; MG/100ML
125 INJECTION, SOLUTION INTRAVENOUS CONTINUOUS
Status: DISCONTINUED | OUTPATIENT
Start: 2020-03-02 | End: 2020-03-02 | Stop reason: HOSPADM

## 2020-03-02 RX ORDER — TRAMADOL HYDROCHLORIDE 50 MG/1
TABLET ORAL
Status: DISCONTINUED
Start: 2020-03-02 | End: 2020-03-02 | Stop reason: HOSPADM

## 2020-03-02 RX ORDER — LIDOCAINE HYDROCHLORIDE 10 MG/ML
0.1 INJECTION, SOLUTION EPIDURAL; INFILTRATION; INTRACAUDAL; PERINEURAL AS NEEDED
Status: DISCONTINUED | OUTPATIENT
Start: 2020-03-02 | End: 2020-03-02 | Stop reason: HOSPADM

## 2020-03-02 RX ORDER — PHENYLEPHRINE HCL IN 0.9% NACL 0.4MG/10ML
SYRINGE (ML) INTRAVENOUS AS NEEDED
Status: DISCONTINUED | OUTPATIENT
Start: 2020-03-02 | End: 2020-03-02 | Stop reason: HOSPADM

## 2020-03-02 RX ORDER — LIDOCAINE HYDROCHLORIDE 20 MG/ML
INJECTION, SOLUTION EPIDURAL; INFILTRATION; INTRACAUDAL; PERINEURAL AS NEEDED
Status: DISCONTINUED | OUTPATIENT
Start: 2020-03-02 | End: 2020-03-02 | Stop reason: HOSPADM

## 2020-03-02 RX ORDER — MIDAZOLAM HYDROCHLORIDE 1 MG/ML
INJECTION, SOLUTION INTRAMUSCULAR; INTRAVENOUS AS NEEDED
Status: DISCONTINUED | OUTPATIENT
Start: 2020-03-02 | End: 2020-03-02 | Stop reason: HOSPADM

## 2020-03-02 RX ORDER — MIDAZOLAM HYDROCHLORIDE 1 MG/ML
1 INJECTION, SOLUTION INTRAMUSCULAR; INTRAVENOUS AS NEEDED
Status: DISCONTINUED | OUTPATIENT
Start: 2020-03-02 | End: 2020-03-02 | Stop reason: HOSPADM

## 2020-03-02 RX ORDER — EPHEDRINE SULFATE/0.9% NACL/PF 50 MG/5 ML
SYRINGE (ML) INTRAVENOUS AS NEEDED
Status: DISCONTINUED | OUTPATIENT
Start: 2020-03-02 | End: 2020-03-02 | Stop reason: HOSPADM

## 2020-03-02 RX ORDER — ONDANSETRON 2 MG/ML
INJECTION INTRAMUSCULAR; INTRAVENOUS AS NEEDED
Status: DISCONTINUED | OUTPATIENT
Start: 2020-03-02 | End: 2020-03-02 | Stop reason: HOSPADM

## 2020-03-02 RX ORDER — FAMOTIDINE 10 MG/ML
10 INJECTION INTRAVENOUS ONCE
Status: DISCONTINUED | OUTPATIENT
Start: 2020-03-02 | End: 2020-03-02 | Stop reason: SDUPTHER

## 2020-03-02 RX ORDER — FENTANYL CITRATE 50 UG/ML
25 INJECTION, SOLUTION INTRAMUSCULAR; INTRAVENOUS
Status: DISCONTINUED | OUTPATIENT
Start: 2020-03-02 | End: 2020-03-02 | Stop reason: HOSPADM

## 2020-03-02 RX ORDER — DEXAMETHASONE SODIUM PHOSPHATE 4 MG/ML
INJECTION, SOLUTION INTRA-ARTICULAR; INTRALESIONAL; INTRAMUSCULAR; INTRAVENOUS; SOFT TISSUE AS NEEDED
Status: DISCONTINUED | OUTPATIENT
Start: 2020-03-02 | End: 2020-03-02 | Stop reason: HOSPADM

## 2020-03-02 RX ORDER — TRAMADOL HYDROCHLORIDE 50 MG/1
50 TABLET ORAL
Qty: 20 TAB | Refills: 0 | Status: SHIPPED | OUTPATIENT
Start: 2020-03-02 | End: 2020-03-07

## 2020-03-02 RX ADMIN — Medication 80 MCG: at 07:54

## 2020-03-02 RX ADMIN — DIPHENHYDRAMINE HYDROCHLORIDE 12.5 MG: 50 INJECTION, SOLUTION INTRAMUSCULAR; INTRAVENOUS at 07:15

## 2020-03-02 RX ADMIN — PHENYLEPHRINE HYDROCHLORIDE 50 MCG/MIN: 10 INJECTION INTRAVENOUS at 07:56

## 2020-03-02 RX ADMIN — ONDANSETRON HYDROCHLORIDE 4 MG: 2 INJECTION, SOLUTION INTRAMUSCULAR; INTRAVENOUS at 08:03

## 2020-03-02 RX ADMIN — FENTANYL CITRATE 25 MCG: 50 INJECTION, SOLUTION INTRAMUSCULAR; INTRAVENOUS at 07:34

## 2020-03-02 RX ADMIN — Medication 5 MG: at 07:41

## 2020-03-02 RX ADMIN — Medication 10 MG: at 07:38

## 2020-03-02 RX ADMIN — DEXAMETHASONE SODIUM PHOSPHATE 4 MG: 4 INJECTION, SOLUTION INTRAMUSCULAR; INTRAVENOUS at 08:03

## 2020-03-02 RX ADMIN — Medication 40 MCG: at 07:41

## 2020-03-02 RX ADMIN — ONDANSETRON 4 MG: 2 INJECTION, SOLUTION INTRAMUSCULAR; INTRAVENOUS at 09:10

## 2020-03-02 RX ADMIN — Medication 5 MG: at 07:39

## 2020-03-02 RX ADMIN — TRAMADOL HYDROCHLORIDE 50 MG: 50 TABLET, FILM COATED ORAL at 09:12

## 2020-03-02 RX ADMIN — FENTANYL CITRATE 25 MCG: 50 INJECTION, SOLUTION INTRAMUSCULAR; INTRAVENOUS at 07:54

## 2020-03-02 RX ADMIN — MIDAZOLAM 2 MG: 1 INJECTION INTRAMUSCULAR; INTRAVENOUS at 07:27

## 2020-03-02 RX ADMIN — FAMOTIDINE 10 MG: 10 INJECTION, SOLUTION INTRAVENOUS at 07:20

## 2020-03-02 RX ADMIN — Medication 80 MCG: at 07:40

## 2020-03-02 RX ADMIN — SODIUM CHLORIDE, SODIUM LACTATE, POTASSIUM CHLORIDE, AND CALCIUM CHLORIDE 125 ML/HR: 600; 310; 30; 20 INJECTION, SOLUTION INTRAVENOUS at 07:10

## 2020-03-02 RX ADMIN — PROPOFOL 50 MG: 10 INJECTION, EMULSION INTRAVENOUS at 07:35

## 2020-03-02 RX ADMIN — LIDOCAINE HYDROCHLORIDE 60 MG: 20 INJECTION, SOLUTION EPIDURAL; INFILTRATION; INTRACAUDAL; PERINEURAL at 07:34

## 2020-03-02 RX ADMIN — PROPOFOL 100 MG: 10 INJECTION, EMULSION INTRAVENOUS at 07:34

## 2020-03-02 NOTE — DISCHARGE INSTRUCTIONS
DISCHARGE SUMMARY from Nurse    The following personal items collected during your admission are returned to you:   Dental Appliance: Dental Appliances: None  Vision:    Hearing Aid:    Jewelry: Jewelry: None  Clothing: Clothing: Other (comment)(clothes and shoes to pacu)  Other Valuables: Other Valuables: None  Valuables sent to safe: ALL CLOTHING/VALUABLES RETURNED TO PATIENT BEFORE D/C HOME    PATIENT INSTRUCTIONS:    The first meal should be something that is light; not greasy or spicy. If this is tolerated, then advance to regular diet as tolerated. Anesthesia Discharge Instructions    After general anesthesia or intervenous sedation, for 24 hours or while taking prescription Narcotics:  · Limit your activities  · Do not drive or operate hazardous machinery  · If you have not urinated within 8 hours after discharge, please contact your surgeon on call. · Do not make important personal or business decisions  · Do not drink alcoholic beverages    Report the following to your surgeon:  · Excessive pain, swelling, redness or odor of or around the surgical area  · Temperature over 100.5 degrees  · Nausea and vomiting lasting longer than 4 hours or if unable to take medication  · Any signs of decreased circulation or nerve impairment to extremity:  Change in color, persistent numbness, tingling, coldness or increased pain. · Any questions    Pain  · Take pain medication as directed by your doctor  ·  Call your doctor if pain is NOT relieved by medication  · DO NOT take aspirin or blood thinners until directed by your doctor       Follow-up Appointments   Procedures    FOLLOW UP VISIT Appointment in: Two Weeks     Standing Status:   Standing     Number of Occurrences:   1     Order Specific Question:   Appointment in     Answer:    Two Weeks         Follow-Up [employer]  · Are usually made nursing staff, the day after your surgery  · If you have any problems, call your doctor as needed Meryle Fulling, M.D.                                                                                Knee, Shoulder, Foot, Hip Surgery                                                                                                 Total Joint Replacement                                                                                                      Arthroscopic Surgery    Lower Extremity Surgery   Discharge Instructions  ALYSIA Odonnell M.D. Please take the time to review the following instructions before you leave the hospital/surgery center and use them as guidelines during your recovery from surgery. If you have any questions you may contact my office at [phone]    Wound Care/Dressing Changes    __X____You may change your dressing as needed beginning on the 3rd day after surgery. When the dressing is removed, you may wash the area, including the sutures or staples, with soap and water. If the wound is still draining at that point, you may cover the incision with gauze and an ACE wrap. It is not necessary to apply antibiotic ointment to the incision. Sutures or staples will be removed at Dr. Shima Hoffmann office during you post-operative visit.    ______Do not remove your dressings or get them wet. Showering/Bathing    __X_____You may shower only. Your dressings may be removed for showering. You may get your incisions wet in the shower. Do not vigorously scrub the area where your incisions are. Apply a clean, dry dressing after gently drying the area of your incisions. Dont take a tub bath; get into a swimming pool or Jacuzzi until the incisions are completely healed. That is, dont soak your incisions under water.    ______Dont get your incisions wet until the staples are removed.  After the staples are removed, you may shower as instructed above.    ______Dont remove your dressings or get them wet until you are seen at your follow-up appointment with Dr. Jamee lGeason. You will be given further instructions at that time. Weight Bearing Status/Braces    __X____Weight bearing as tolerated. Use crutches, walker or cane as needed for support. You may move your joints as tolerated.    ______Touch toe weight bearing. Dont bear weight on the leg you had surgery on. Use your toes only to steady yourself as you walk with the aid of crutches, walker or cane. Exercises    __X____Work to fully straighten your knee, attempt to hyperextend  __X____Pump your ankle up and down throughout the day  __X____Bend your knee as much as you can comfortably tolerate  __X____Contract your quadriceps (thigh) muscles and count to 10, repeat 10 times/day    Ice/Elevation    __X____Continue ice and elevation consistently for the next 48 hours. You may begin ice and elevation as needed for pain and swelling. You should ice your knee as least three times each day for one week in cycles of ice on for twenty minutes, ice off for twenty minutes. Repeat for a total of two hours each time. Diet    __X____You may advance to your regular diet as tolerated. Increase your clear liquid intake for the next 2 to 3 days. Medication    __X____You will be given a prescription pain medicine when you are discharged from the hospital/surgery center. Take the medication on an as needed basis according to the directions on the prescription bottle. Possible side effects of this medication include dizziness, headache, nausea, vomiting, constipation, and urinary retention. If you experience any of these side effects, call the office so that we may assist you in relieving them. Stop the use of pain medications if you develop excessive itching, a rash, shortness of breath, or difficulty swallowing. If these symptoms are severe, or are not relieved by stopping the medication, you should seek immediate medical attention.  Refills of pain medication are authorized during office hours ONLY (8am-5pm, M-F)    __X____You may resume the medications you were taking prior to surgery. Pain medication may potentate the effects of any anti-depressant medication you taking. If you have any questions about possible interactions between you regular medications and the pain medication, you should consult your medical doctor who prescribes your regular medications. ______You may use Ibuprofen 800mg three to four times a day for 2-3 days after surgery. This can be used along with your pain medication    __X____Begin taking 81mg Aspirin TWICE A DAY for two weeks, begin on the day of surgery. Follow Up    __X___ Follow up with Dr. René Fitzpatrick in [facility], please call the office to schedule your appointment [phone].

## 2020-03-02 NOTE — ANESTHESIA POSTPROCEDURE EVALUATION
Post-Anesthesia Evaluation and Assessment    Patient: Ginny Stubbs MRN: [id number]  SSN: [id number]    YOB: 1945  Age: 69 y.o. Sex: male      I have evaluated the patient and they are stable and ready for discharge from the PACU. Cardiovascular Function/Vital Signs  Visit Vitals  /80   Pulse 71   Temp 36.2 °C (97.1 °F)   Resp 15   Wt 83 kg (183 lb)   SpO2 94%   BMI 26.26 kg/m²       Patient is status post General anesthesia for Procedure(s):  LEFT KNEE ARTHROSCOPY/MEDIAL MENISCECTOMY, CHONDROPLASTY, MEDIAL FEMORAL CONDYLE AND CHONDROPLASTY  PATELLA. Nausea/Vomiting: None    Postoperative hydration reviewed and adequate. Pain:  Pain Scale 1: Numeric (0 - 10) (03/02/20 0900)  Pain Intensity 1: 0 (03/02/20 0900)   Managed    Neurological Status:   Neuro (WDL): Within Defined Limits (03/02/20 0900)  Neuro  Neurologic State: Alert; Appropriate for age (03/02/20 0900)  Orientation Level: Oriented X4 (03/02/20 0900)  Cognition: Follows commands; Appropriate decision making; Appropriate for age attention/concentration; Appropriate safety awareness (03/02/20 0900)  Speech: Clear (03/02/20 0900)  LUE Motor Response: Purposeful (03/02/20 0900)  LLE Motor Response: Purposeful (03/02/20 0900)  RUE Motor Response: Purposeful (03/02/20 0900)  RLE Motor Response: Purposeful (03/02/20 0900)   At baseline    Mental Status, Level of Consciousness: Alert and  oriented to person, place, and time    Pulmonary Status:   O2 Device: Room air (03/02/20 0900)   Adequate oxygenation and airway patent    Complications related to anesthesia: None    Post-anesthesia assessment completed.  No concerns    Signed By: Paula Cruz MD     March 2, 2020              Procedure(s):  LEFT KNEE ARTHROSCOPY/MEDIAL MENISCECTOMY, CHONDROPLASTY, MEDIAL FEMORAL CONDYLE AND CHONDROPLASTY  PATELLA.    general    <BSHSIANPOST>    Vitals Value Taken Time   /80 3/2/2020  9:00 AM   Temp 36.2 °C (97.1 °F) 3/2/2020  8:20 AM Pulse 68 3/2/2020  9:04 AM   Resp 12 3/2/2020  9:04 AM   SpO2 99 % 3/2/2020  9:04 AM   Vitals shown include unvalidated device data.

## 2020-03-02 NOTE — ANESTHESIA PREPROCEDURE EVALUATION
Relevant Problems   No relevant active problems       Anesthetic History   No history of anesthetic complications            Review of Systems / Medical History  Patient summary reviewed, nursing notes reviewed and pertinent labs reviewed    Pulmonary  Within defined limits                 Neuro/Psych   Within defined limits           Cardiovascular  Within defined limits                Exercise tolerance: >4 METS  Comments: H/o SSS??   GI/Hepatic/Renal  Within defined limits   GERD: well controlled           Endo/Other  Within defined limits      Arthritis     Other Findings   Comments: itching           Physical Exam    Airway  Mallampati: II  TM Distance: > 6 cm  Neck ROM: normal range of motion   Mouth opening: Normal     Cardiovascular  Regular rate and rhythm,  S1 and S2 normal,  no murmur, click, rub, or gallop             Dental  No notable dental hx       Pulmonary  Breath sounds clear to auscultation               Abdominal  GI exam deferred       Other Findings            Anesthetic Plan    ASA: 3  Anesthesia type: general          Induction: Intravenous  Anesthetic plan and risks discussed with: Patient

## 2020-03-02 NOTE — PERIOP NOTES
Patient WBAT.  Patient denies need of PT consult for crutch training, has used crutches in the past.

## 2020-03-02 NOTE — BRIEF OP NOTE
BRIEF OPERATIVE NOTE    Date of Procedure: 3/2/2020   Preoperative Diagnosis: MEDIAL MENISCAL TEAR LEFT KNEE  Postoperative Diagnosis: Grade 3 chondromalacia medial femoral condyle, patella  LEFT KNEE    Procedure(s):  LEFT KNEE ARTHROSCOPY, CHONDROPLASTY MEDIAL FEMORAL CONDYLE AND CHONDROPLASTY  PATELLA  Surgeon(s) and Role:     Ly Steward MD - Primary         Surgical Assistant: none    Surgical Staff:  Scrub Tech-1: Flaquita Lopez  Scrub RN-1: Mary Jane Smith  Float Staff: Raina Miner Time In Time Out   Incision Start 0813    Incision Close 0807      Anesthesia: General   Estimated Blood Loss: minimal  Specimens: * No specimens in log *   Findings: grade 3 chondromalacia medial femoral condyle, small, stable radial tear posterior horn medial meniscus   Complications: none  Implants: * No implants in log *

## 2020-03-14 PROBLEM — M75.122 COMPLETE TEAR OF LEFT ROTATOR CUFF: Status: ACTIVE | Noted: 2020-03-14

## 2020-11-16 ENCOUNTER — OFFICE VISIT (OUTPATIENT)
Dept: SURGERY | Age: 75
End: 2020-11-16
Payer: MEDICARE

## 2020-11-16 VITALS
RESPIRATION RATE: 18 BRPM | TEMPERATURE: 97.2 F | DIASTOLIC BLOOD PRESSURE: 64 MMHG | HEIGHT: 70 IN | SYSTOLIC BLOOD PRESSURE: 129 MMHG | OXYGEN SATURATION: 98 % | HEART RATE: 69 BPM | BODY MASS INDEX: 26.26 KG/M2

## 2020-11-16 DIAGNOSIS — R10.9 LEFT SIDED ABDOMINAL PAIN: Primary | ICD-10-CM

## 2020-11-16 DIAGNOSIS — R10.9 LEFT SIDED ABDOMINAL PAIN: ICD-10-CM

## 2020-11-16 PROCEDURE — 99203 OFFICE O/P NEW LOW 30 MIN: CPT | Performed by: SURGERY

## 2020-11-16 PROCEDURE — 1101F PT FALLS ASSESS-DOCD LE1/YR: CPT | Performed by: SURGERY

## 2020-11-16 PROCEDURE — G8419 CALC BMI OUT NRM PARAM NOF/U: HCPCS | Performed by: SURGERY

## 2020-11-16 PROCEDURE — 3017F COLORECTAL CA SCREEN DOC REV: CPT | Performed by: SURGERY

## 2020-11-16 PROCEDURE — G8510 SCR DEP NEG, NO PLAN REQD: HCPCS | Performed by: SURGERY

## 2020-11-16 PROCEDURE — G8427 DOCREV CUR MEDS BY ELIG CLIN: HCPCS | Performed by: SURGERY

## 2020-11-16 PROCEDURE — G8536 NO DOC ELDER MAL SCRN: HCPCS | Performed by: SURGERY

## 2020-11-16 RX ORDER — TESTOSTERONE CYPIONATE 100 MG/ML
INJECTION, SOLUTION INTRAMUSCULAR
COMMUNITY
End: 2021-07-01

## 2020-11-16 NOTE — PROGRESS NOTES
Chief Complaint   Patient presents with    Possible Hernia     Seen at the request of Dr. Ashley Espinoza, eval hernia. Pt c/o central abdominal pain and left side pain.

## 2020-11-16 NOTE — PROGRESS NOTES
HISTORY OF PRESENT ILLNESS  Juan Banuelos is a 77 y.o. male. Had a Parmova 23 6/2017    Had a GES in 2018: Normal gastric emptying study    Patient is not a great historian and has been to multiple facilities and seen multiple doctors making getting a complete picture of his medical history difficult. Here today due to \" issues with his hernia\"    He reports having issues since having hernia surgery in January 2017. He he is concerned the mesh is causing him problems. I asked him this is regarding the paraesophageal hernia repair that he had done in June 2017, but he is insistent he had a another hernia surgery in January. May be at [address]? I have asked him to help me find some records. He points to his entire lower chest and abdomen as the site of his symptoms. No dysphasia  Some decreased appetite  Spicy foods cause heartburn and diarrhea    Some bowel irregularity    Cant remember the name of his GI doctor    Had a CT at [address] Pt reports everything is fine. Currently not on a PPI  Takes occ tums    His specific complaint today is point tenderness on his left abdomen.        ____________________________________________________________________________  Patient presents with:  Possible Hernia: Seen at the request of Dr. Tesha Martinez, eval hernia. /64 (BP 1 Location: Left arm, BP Patient Position: Sitting)   Pulse 69   Temp 97.2 °F (36.2 °C) (Oral)   Resp 18   Ht 5' 10\" (1.778 m)   Wt (P) 82.1 kg (181 lb)   SpO2 98%   BMI (P) 25.97 kg/m²   Past Medical History:  No date:  Adverse effect of anesthesia      Comment:  PT STATES \" I HAD VERY LOW BLOOD PRESSURE FOR A FEW                WEEKS FOLLOWING SINUS SURGERY\"  No date: Anemia  No date: Arrhythmia      Comment:  OF UNKNOWN TYPE PER PATIENT, PT STOPPED GOING TO                CARDIOLOGIST  No date: Arthritis  No date: Asthma      Comment:  AS A CHILD  No date: Autoimmune disease ([facility]      Comment:  CHRONIC FATIGUE  No date: CFS (chronic fatigue syndrome)  No date: Contact dermatitis and other eczema, due to unspecified cause  No date: GERD (gastroesophageal reflux disease)  No date: Hiatal hernia  No date: Rotator cuff disorder      Comment:  right  Past Surgical History:  No date: HX APPENDECTOMY  No date: HX COLONOSCOPY  07/2014: HX HEENT      Comment:  SINUS SURGERY- \"CLEANING OUT\" INFECTION OF SINUSES  1986: HX HERNIA REPAIR; Right      Comment:  INGUINAL  01/2017: HX HERNIA REPAIR      Comment:  Laparoscopic paraesophageal hernia repair with mesh   01/2017: HX LAP CHOLECYSTECTOMY  1988: HX ORTHOPAEDIC; Right      Comment:  SHOULDER REPAIR ( PT DOESN'T KNOW)   1964: HX ORTHOPAEDIC      Comment:   BONE SPURS/NODULE REMOVED NEAR LEFT FEMUR  2004: HX RETINAL DETACHMENT REPAIR; Left  06/2017: HX ROTATOR CUFF REPAIR; Right  No date: HX TONSILLECTOMY  Social History    Socioeconomic History      Marital status: SINGLE      Spouse name: Not on file      Number of children: Not on file      Years of education: Not on file      Highest education level: Not on file    Tobacco Use      Smoking status: Never Smoker      Smokeless tobacco: Never Used    Substance and Sexual Activity      Alcohol use: Yes        Comment: RARELY      Drug use: No    Review of patient's family history indicates:  Problem: Hypertension      Relation: Mother          Age of Onset: (Not Specified)  Problem: Stroke      Relation: Mother          Age of Onset: (Not Specified)  Problem: Diabetes      Relation: Mother          Age of Onset: (Not Specified)  Problem: Heart Disease      Relation: Mother          Age of Onset: (Not Specified)  Problem: Cancer      Relation: Father          Age of Onset: (Not Specified)          Comment: ABDOMINAL CANCER  Problem: Anesth Problems      Relation: Neg Hx          Age of Onset: (Not Specified)    Current Outpatient Medications:  testosterone cypionate (DEPO-TESTOSTERONE) 100 mg/mL injection, by IntraMUSCular route.   diphenhydramine HCl (ALLERGY PO), Take  by mouth as needed. calcium carbonate (TUMS) 200 mg calcium (500 mg) chew, Take 1 Tab by mouth as needed. acetaminophen (TYLENOL) 325 mg cap, Take 650 mg by mouth every six (6) hours as needed for Pain. (Patient not taking: Reported on 11/16/2020)    No current facility-administered medications for this visit. Allergies:  -- Latex -- Itching    --  AND HIVES   -- Sulfa (Sulfonamide Antibiotics) -- Anaphylaxis   -- Broccoli -- Nausea and Vomiting   -- Chlorhexidine -- Rash   -- Diltiazem -- Nausea Only   -- Gluten -- Other (comments)    --  \" WANTS TO SLEEP 24 HOURS A DAY , AND IT AFFECTS             MY HEART\", PT CAN'T ELABORATE. -- Ibuprofen -- Nausea and Vomiting   -- Lactose -- Other (comments) and Hives    --  EXTREME INDIGESTION             EXTREME INDIGESTION   -- Morphine -- Itching    --  Patient stated   -- Other Plant, Animal, Environmental -- Nausea and Vomiting    --  Pink food coloring   -- Percocet (Oxycodone-Acetaminophen) -- Itching   -- Propafenone -- Other (comments) and Nausea and                            Vomiting    --  Nausea and pain             Nausea and pain   -- Tapioca -- Nausea and Vomiting   -- Tramadol -- Nausea and Vomiting   -- Wheat -- Other (comments)    --  \" WANTS TO SLEEP 24 HOURS A DAY\"  _____________________________________________________________________________      Possible Hernia   The history is provided by the patient. This is a chronic problem. The current episode started more than 1 week ago. The problem occurs constantly. The problem has not changed since onset. Associated symptoms include chest pain and abdominal pain. Pertinent negatives include no headaches and no shortness of breath. The treatment provided no relief. Review of Systems   Constitutional: Negative for chills, fever and weight loss. HENT: Negative for ear pain. Eyes: Negative for pain. Respiratory: Negative for shortness of breath.     Cardiovascular: Positive for chest pain. Gastrointestinal: Positive for abdominal pain. Negative for blood in stool. Genitourinary: Negative for hematuria. Musculoskeletal: Negative for joint pain. Skin: Negative for rash. Neurological: Negative for dizziness, focal weakness, seizures and headaches. Endo/Heme/Allergies: Does not bruise/bleed easily. Psychiatric/Behavioral: The patient does not have insomnia. Physical Exam  Constitutional:       General: He is not in acute distress. Appearance: He is well-developed. HENT:      Head: Normocephalic. Mouth/Throat:      Pharynx: No oropharyngeal exudate. Eyes:      General: No scleral icterus. Pupils: Pupils are equal, round, and reactive to light. Neck:      Musculoskeletal: Neck supple. Trachea: No tracheal deviation. Cardiovascular:      Rate and Rhythm: Normal rate and regular rhythm. Heart sounds: Normal heart sounds. Pulmonary:      Effort: Pulmonary effort is normal.      Breath sounds: Normal breath sounds. No wheezing. Abdominal:      General: There is no distension. Palpations: Abdomen is soft. There is no mass. Tenderness: There is abdominal tenderness. Hernia: No hernia is present. Musculoskeletal: Normal range of motion. Lymphadenopathy:      Cervical: No cervical adenopathy. Skin:     General: Skin is warm. Findings: No erythema or rash. Neurological:      Mental Status: He is alert and oriented to person, place, and time. Psychiatric:         Behavior: Behavior normal.         ASSESSMENT and PLAN    ICD-10-CM ICD-9-CM    1. Left sided abdominal pain  R10.9 789.09 CT ABD PELV W CONT     I started I reassured him that the mesh that was placed for his paraesophageal hernia by Dr Betsy Mejia was absorbable and is no longer present. If there is another hernia surgery that had done in January of that year, I have asked him to let me know where it was done so we can get the records.      He appears to been worked up since the Parmova 23 including a normal gastric emptying study and a follow-up barium swallow that showed a possible small hiatal hernia but otherwise things were intact. I do not know if he is been worked up for this by GI, he cannot remember the name of his GI doctor. he will find the records at home and let us know. All of his symptoms are very nonspecific and do not seem surgical.    He is insistent that this left-sided pain is new. I do not feel anything by exam.  We can evaluate further with a CT scan. Follow-up after the CT scan. Expressed understanding of our discussion and agreeable with the plan. Thank you for this consult.

## 2020-11-16 NOTE — Clinical Note
11/16/20 Patient: Terrence Banuelos YOB: 1945 Date of Visit: 11/16/2020 Zoya Garcia MD 
VIA In Basket Dear Zoya Garcia MD, Thank you for referring Mr. Octavio Banuelos to  [facility] for evaluation. My notes for this consultation are attached. If you have questions, please do not hesitate to call me. I look forward to following your patient along with you. Sincerely, Elma Dong MD

## 2020-11-20 ENCOUNTER — HOSPITAL ENCOUNTER (OUTPATIENT)
Dept: CT IMAGING | Age: 75
Discharge: HOME | End: 2020-11-20
Attending: SURGERY
Payer: MEDICARE

## 2020-11-20 LAB — CREAT BLD-MCNC: 0.9 MG/DL (ref 0.6–1.3)

## 2020-11-20 PROCEDURE — 74177 CT ABD & PELVIS W/CONTRAST: CPT

## 2020-11-20 PROCEDURE — 82565 ASSAY OF CREATININE: CPT

## 2020-11-20 PROCEDURE — 74011000636 HC RX REV CODE- 636: Performed by: SURGERY

## 2020-11-20 RX ORDER — ARM BRACE
1 EACH MISCELLANEOUS DAILY
Qty: 1 EACH | Refills: 2 | Status: SHIPPED | OUTPATIENT
Start: 2020-11-20 | End: 2020-11-20 | Stop reason: CLARIF

## 2020-11-20 RX ORDER — SODIUM CHLORIDE 0.9 % (FLUSH) 0.9 %
10 SYRINGE (ML) INJECTION
Status: COMPLETED | OUTPATIENT
Start: 2020-11-20 | End: 2020-11-20

## 2020-11-20 RX ADMIN — Medication 10 ML: at 14:30

## 2020-11-20 RX ADMIN — IOHEXOL 50 ML: 240 INJECTION, SOLUTION INTRATHECAL; INTRAVASCULAR; INTRAVENOUS; ORAL at 14:30

## 2020-11-20 RX ADMIN — IOPAMIDOL 100 ML: 755 INJECTION, SOLUTION INTRAVENOUS at 14:30

## 2020-11-24 ENCOUNTER — TELEPHONE (OUTPATIENT)
Dept: SURGERY | Age: 75
End: 2020-11-24

## 2020-11-24 DIAGNOSIS — R47.02 DYSPHASIA: Primary | ICD-10-CM

## 2020-11-24 NOTE — TELEPHONE ENCOUNTER
Routed to physician. · Presents c/o intermittent desats  · On arrival, desats to high 80's despite nasal cannula  · Required ICU transition due mucus plugging   · Continue chest PT, mucolytics etc  · Appreciate pulm input Fall Risk

## 2020-11-25 ENCOUNTER — TELEPHONE (OUTPATIENT)
Dept: SURGERY | Age: 75
End: 2020-11-25

## 2020-11-25 DIAGNOSIS — R47.02 DYSPHASIA: ICD-10-CM

## 2020-11-25 NOTE — TELEPHONE ENCOUNTER
Reviewed CT  IMPRESSION:  No acute abnormality identified. I reviewed the images. He does have a small left inguinal hernia containing fat and a small umbilical hernia. No acute findings to explain his symptoms. He was to get back with us and let us know who his GI doctor is I can get the records. Currently complaining of chest pain after eating. Starts left flank and radiates superiorly. His last BAS was in 2017. I gave him 2 options:  1. Return to GI for further work-up. 2.  Repeat the BAS to reevaluate the esophagus. Expressed understanding of our discussion. He wants to proceed with a BAS. Further management after those results.

## 2020-11-25 NOTE — TELEPHONE ENCOUNTER
Called patient and he will call to schedule his BAS here at [facility]. I gave him the number for scheduling [phone].

## 2020-11-25 NOTE — TELEPHONE ENCOUNTER
Patient called again wanting to know his CT results. He also stated that he is having pain on his left side from the bottom of his ribcage all the way up to the breast. He said that it hurts to lay on it and even to the touch. The patient also says that after he eats he feels nauseous but denies vomiting. He says the only thing that makes it better is to lay down for 2-3 hours. Patient would like a call back from Dr. Shelbi Calderón.

## 2020-12-03 ENCOUNTER — HOSPITAL ENCOUNTER (OUTPATIENT)
Dept: GENERAL RADIOLOGY | Age: 75
Discharge: HOME | End: 2020-12-03
Attending: SURGERY
Payer: MEDICARE

## 2020-12-03 PROCEDURE — 74220 X-RAY XM ESOPHAGUS 1CNTRST: CPT

## 2020-12-07 ENCOUNTER — TELEPHONE (OUTPATIENT)
Dept: SURGERY | Age: 75
End: 2020-12-07

## 2020-12-07 DIAGNOSIS — K21.9 GASTROESOPHAGEAL REFLUX DISEASE, UNSPECIFIED WHETHER ESOPHAGITIS PRESENT: ICD-10-CM

## 2020-12-07 DIAGNOSIS — R10.9 LEFT SIDED ABDOMINAL PAIN: Primary | ICD-10-CM

## 2020-12-07 NOTE — TELEPHONE ENCOUNTER
I reviewed the barium swallow: IMPRESSION:  Previous Nissen fundoplication, with slight increase in size of a small  recurrent hiatal hernia, and moderate gastroesophageal reflux. Mild diffuse  esophageal dysmotility. Please refer him back to his GI doctor to consider Manometry and a Bravo to be considered for redo hiatal hernia repair.

## 2021-07-01 ENCOUNTER — OFFICE VISIT (OUTPATIENT)
Dept: HEMATOLOGY | Age: 76
End: 2021-07-01
Payer: MEDICARE

## 2021-07-01 VITALS
TEMPERATURE: 97.1 F | BODY MASS INDEX: 26.69 KG/M2 | SYSTOLIC BLOOD PRESSURE: 132 MMHG | HEART RATE: 52 BPM | DIASTOLIC BLOOD PRESSURE: 91 MMHG | OXYGEN SATURATION: 97 % | WEIGHT: 186.4 LBS | HEIGHT: 70 IN | RESPIRATION RATE: 18 BRPM

## 2021-07-01 DIAGNOSIS — R79.9 ABNORMAL FINDING OF BLOOD CHEMISTRY, UNSPECIFIED: ICD-10-CM

## 2021-07-01 DIAGNOSIS — R10.11 RUQ PAIN: Primary | ICD-10-CM

## 2021-07-01 DIAGNOSIS — Z11.59 ENCOUNTER FOR SCREENING FOR OTHER VIRAL DISEASES: ICD-10-CM

## 2021-07-01 PROBLEM — Z98.890 H/O SHOULDER SURGERY: Status: ACTIVE | Noted: 2020-03-14

## 2021-07-01 PROBLEM — K82.8 BILIARY DYSKINESIA: Status: RESOLVED | Noted: 2017-06-06 | Resolved: 2021-07-01

## 2021-07-01 PROCEDURE — 3017F COLORECTAL CA SCREEN DOC REV: CPT | Performed by: INTERNAL MEDICINE

## 2021-07-01 PROCEDURE — G8536 NO DOC ELDER MAL SCRN: HCPCS | Performed by: INTERNAL MEDICINE

## 2021-07-01 PROCEDURE — G8427 DOCREV CUR MEDS BY ELIG CLIN: HCPCS | Performed by: INTERNAL MEDICINE

## 2021-07-01 PROCEDURE — G0463 HOSPITAL OUTPT CLINIC VISIT: HCPCS | Performed by: INTERNAL MEDICINE

## 2021-07-01 PROCEDURE — G8432 DEP SCR NOT DOC, RNG: HCPCS | Performed by: INTERNAL MEDICINE

## 2021-07-01 PROCEDURE — G8419 CALC BMI OUT NRM PARAM NOF/U: HCPCS | Performed by: INTERNAL MEDICINE

## 2021-07-01 PROCEDURE — 99203 OFFICE O/P NEW LOW 30 MIN: CPT | Performed by: INTERNAL MEDICINE

## 2021-07-01 PROCEDURE — 1101F PT FALLS ASSESS-DOCD LE1/YR: CPT | Performed by: INTERNAL MEDICINE

## 2021-07-01 RX ORDER — TESTOSTERONE 25 MG/2.5G
25 GEL TRANSDERMAL DAILY
COMMUNITY

## 2021-07-01 NOTE — PROGRESS NOTES
Identified pt with two pt identifiers(name and ). Reviewed record in preparation for visit and have obtained necessary documentation. Chief Complaint   Patient presents with    Other     Biliary Dyskinesia    New Patient      Vitals:    21 1042   BP: (!) 132/91   Pulse: (!) 52   Resp: 18   Temp: 97.1 °F (36.2 °C)   TempSrc: Temporal   SpO2: 97%   Weight: 186 lb 6.4 oz (84.6 kg)   Height: 5' 10\" (1.778 m)   PainSc:   7   PainLoc: Abdomen       Health Maintenance Review: Patient reminded of \"due or due soon\" health maintenance. I have asked the patient to contact his/her primary care provider (PCP) for follow-up on his/her health maintenance. Coordination of Care Questionnaire:  :   1) Have you been to an emergency room, urgent care, or hospitalized since your last visit? If yes, where when, and reason for visit? no       2. Have seen or consulted any other health care provider since your last visit? If yes, where when, and reason for visit? NO      Patient is accompanied by self I have received verbal consent from [address] to discuss any/all medical information while they are present in the room.

## 2021-07-01 NOTE — Clinical Note
7/11/2021    Patient: Kimberlyn Banuelos   YOB: 1945   Date of Visit: 7/1/2021     Aris Busch MD  [address] [place] [address] [address] [place] [id number]  Via Fax: [phone]    Dear Aris Busch MD,      Thank you for referring Mr. Octavio Banuelos to [facility] for evaluation. My notes for this consultation are attached. If you have questions, please do not hesitate to call me. I look forward to following your patient along with you.       Sincerely,    Laura Feliz MD

## 2021-07-01 NOTE — PROGRESS NOTES
[address] [facility]      Marisol Coats MD, Agustin Griffith, Alexys Hartmann MD, MPH      Kalee Crystal, PA-SANDRA Alegre, [facility]     Glenny Bailey, [facility]   Bianca Jordan, [facility]    Cal Miramontes, [facility]       Glenis Larson Celso [address] 136    at [address], [address] [place] [address], [facility] 22.    [phone]    FAX: [address] [place] [address], [address]    [phone]    FAX: [phone]       Patient Care Team:  Edwar Brown MD as PCP - General (Internal Medicine)  Edwar Brown MD as PCP - [facility]  Natalie Dawn MD as Physician (Cardiology)  Facundo Monroe MD as Physician (Cardiology)  Ata Miller MD as Surgeon (General Surgery)  Julia Dhaliwal MD as Surgeon (General Surgery)      Problem List  Date Reviewed: 7/11/2021        Codes Class Noted    History of Nissen fundoplication RMM-33-YK: Z41.027  ICD-9-CM: V15.29  7/11/2021        H/O shoulder surgery ICD-10-CM: Z98.890  ICD-9-CM: V45.89  3/14/2020        Tear of medial meniscus of left knee, current ICD-10-CM: X67.090C  ICD-9-CM: 836.0  2/26/2020        SSS (sick sinus syndrome) ([facility] ICD-10-CM: I49.5  ICD-9-CM: 427.81  7/30/2015        Abnormal EKG ICD-10-CM: R94.31  ICD-9-CM: 794.31  6/24/2015        GERD (gastroesophageal reflux disease) ICD-10-CM: K21.9  ICD-9-CM: 530.81  1/31/2015              The clinicians listed above have asked me to see Trenda Revels L Six in consultation fatty liver and several liver cysts. All medical records sent by the referring physicians were reviewed including imaging studies     The patient is a 77 y.o.  male who develoepd RUQ in 11/2020.      Serologic evaluation for markers of chronic liver disease has either not been performed or the results are not available. CT scan of the liver was performed in 11/2020. The results of the imaging suggested fatty liver with several hypodense lesions in the liver. An assessment of liver fibrosis with biopsy, Fibroscan or elastography has not been performed. The patient has the following symptoms which could be attributed to the liver disorder:    fatigue,   pain in the right side over the liver,     The patient is not experiencing the following symptoms which are commonly seen in this liver disorder:   fevers,   chills,   nausea,   vomiting,     The patient completes all daily activities without any functional limitations. ASSESSMENT AND PLAN:  Abnormal liver CT scan  A recent CT scan suggests several hypodense lesions in the liver consistent with cysts. The largest lesion in 2.2 x 1.4 cm    Liver transaminases are normal.  ALP is normal.  Liver function is normal.  The platelet count is   normal.      Serologic testing for causes of chronic liver disease was negative     The need to perform an assessment of liver fibrosis was discussed with the patient. The Fibroscan can assess liver fibrosis and determine if a patient has advanced fibrosis or cirrhosis without the need for liver biopsy. This will be performed at the next office visit. If the Fibroscan suggests advanced fibrosis then a liver biopsy should be considered. The Fibroscan can be repeated annually or as often as clinically indicated to assess for fibrosis progression and/or regression. Will perform imaging of the liver with dynamic MRI. Have performed laboratory testing to monitor liver function and degree of liver injury. This included BMP, hepatic panel, CBC with platelet count, INR. Screening for Hepatocellular Carcinoma  HCC screening is not necessary if the patient has no evidence of cirrhosis.     Treatment of other medical problems in patients with chronic liver disease  There are no contraindications for the patient to take most medications that are necessary for treatment of other medical issues. Counseling for alcohol in patients with chronic liver disease  The patient does not consume any significant amount of alcohol. Vaccinations   Routine vaccinations against other bacterial and viral agents can be performed as indicated. Annual flu vaccination should be administered if indicated. ALLERGIES  Allergies   Allergen Reactions    Latex Itching     AND HIVES    Sulfa (Sulfonamide Antibiotics) Anaphylaxis    Broccoli Nausea and Vomiting    Chlorhexidine Rash    Diltiazem Nausea Only    Gluten Other (comments)     \" WANTS TO SLEEP 24 HOURS A DAY , AND IT AFFECTS MY HEART\", PT CAN'T ELABORATE.  Ibuprofen Nausea and Vomiting    Lactose Other (comments) and Hives     EXTREME INDIGESTION  EXTREME INDIGESTION    Morphine Itching     Patient stated    Other Plant, Animal, Environmental Nausea and Vomiting     Pink food coloring    Percocet [Oxycodone-Acetaminophen] Itching    Propafenone Other (comments) and Nausea and Vomiting     Nausea and pain    Nausea and pain    Tapioca Nausea and Vomiting    Tramadol Nausea and Vomiting    Wheat Other (comments)     \" WANTS TO SLEEP 24 HOURS A DAY\"       MEDICATIONS  Current Outpatient Medications   Medication Sig    testosterone (ANDROGEL) 1 % (25 mg/2.5gram) glpk 25 mg by TransDERmal route daily.  diphenhydramine HCl (ALLERGY PO) Take  by mouth as needed. No current facility-administered medications for this visit. SYSTEM REVIEW NOT RELATED TO LIVER DISEASE OR REVIEWED ABOVE:  Constitution systems: Negative for fever, chills, weight gain, weight loss. Eyes: Negative for visual changes. ENT: Negative for sore throat, painful swallowing. Respiratory: Negative for cough, hemoptysis, SOB. Cardiology: Negative for chest pain, palpitations. GI:  Negative for constipation or diarrhea.   : Negative for urinary frequency, dysuria, hematuria, nocturia. Skin: Negative for rash. Hematology: Negative for easy bruising, blood clots. Musculo-skelatal: Negative for back pain, muscle pain, weakness. Neurologic: Negative for headaches, dizziness, vertigo, memory problems not related to HE. Psychology: Negative for anxiety, depression. FAMILY HISTORY:  The father  of colon cancer. The mother  at age 80 yers. There is no family history of liver disease. SOCIAL HISTORY:  The patient is . The patient has 1 child. The patient has never used tobacco products. The patient has never consumed significant amounts of alcohol. The patient used to work as  for [employer] . PHYSICAL EXAMINATION:  Visit Vitals  BP (!) 132/91 (BP 1 Location: Right upper arm, BP Patient Position: Sitting, BP Cuff Size: Adult)   Pulse (!) 52   Temp 97.1 °F (36.2 °C) (Temporal)   Resp 18   Ht 5' 10\" (1.778 m)   Wt 186 lb 6.4 oz (84.6 kg)   SpO2 97%   BMI 26.75 kg/m²     General: No acute distress. Eyes: Sclera anicteric. ENT: No oral lesions. Thyroid normal.  Nodes: No adenopathy. Skin: No spider angiomata. No jaundice. No palmar erythema. Respiratory: Lungs clear to auscultation. Cardiovascular: Regular heart rate. No murmurs. No JVD. Abdomen: Soft non-tender. Liver size normal to percussion/palpation. Spleen not palpable. No obvious ascites. Extremities: No edema. No muscle wasting. No gross arthritic changes. Neurologic: Alert and oriented. Cranial nerves grossly intact. No asterixis.     LABORATORY STUDIES:  Liver [place] of [address] Units 2021   WBC 3.4 - 10.8 x10E3/uL 6.3   ANC 1.4 - 7.0 x10E3/uL 5.6   HGB 13.0 - 17.7 g/dL 14.8    - 450 x10E3/uL 188   INR 0.9 - 1.2 1.0   AST 0 - 40 IU/L 11   ALT 0 - 44 IU/L 12   Alk Phos 48 - 121 IU/L 65   Bili, Total 0.0 - 1.2 mg/dL 0.5   Bili, Direct 0.00 - 0.40 mg/dL 0.13   Albumin 3.7 - 4.7 g/dL 4. 6   BUN 8 - 27 mg/dL 16   Creat 0.76 - 1.27 mg/dL 0.93   Creat (iSTAT) 0.6 - 1.3 mg/dL    Na 134 - 144 mmol/L 142   K 3.5 - 5.2 mmol/L 4.5   Cl 96 - 106 mmol/L 106   CO2 20 - 29 mmol/L 24   Glucose 65 - 99 mg/dL 137 (H)     SEROLOGIES:  Serologies Latest Ref Rng & Units 7/1/2021   Hep B Surface Ag Negative Negative   Hep B Core Ab, Total Negative Negative   Hep B Surface AB QL  Non Reactive   Hep C Ab 0.0 - 0.9 s/co ratio <0.1   Ferritin 30 - 400 ng/mL 52   Alpha-1 antitrypsin level 101 - 187 mg/dL 115     LIVER HISTOLOGY:  Not available or performed    ENDOSCOPIC PROCEDURES:  Not available or performed    RADIOLOGY:  11/2020. CT scan abdomen with IV contrast.  NOrmal appearing liver. Severllow density lesions in liver. The largest is 2.2 x 1.4 cm. Most likely cysts. OTHER TESTING:  Not available or performed    FOLLOW-UP:  All of the issues listed above in the Assessment and Plan were discussed with the patient. All questions were answered. The patient expressed a clear understanding of the above. [facility] 87 in 4 weeks for Fibroscan which should be 1-2 weeks after the next imaging study.         Darcy Bhatia MD  [facility] of [address], [address] [facility] 22.  [phone]  [address] [place] [address]

## 2021-07-02 LAB
A1AT SERPL-MCNC: 115 MG/DL (ref 101–187)
ALBUMIN SERPL-MCNC: 4.6 G/DL (ref 3.7–4.7)
ALP SERPL-CCNC: 65 IU/L (ref 48–121)
ALT SERPL-CCNC: 12 IU/L (ref 0–44)
AST SERPL-CCNC: 11 IU/L (ref 0–40)
BASOPHILS # BLD AUTO: 0 X10E3/UL (ref 0–0.2)
BASOPHILS NFR BLD AUTO: 1 %
BILIRUB DIRECT SERPL-MCNC: 0.13 MG/DL (ref 0–0.4)
BILIRUB SERPL-MCNC: 0.5 MG/DL (ref 0–1.2)
BUN SERPL-MCNC: 16 MG/DL (ref 8–27)
BUN/CREAT SERPL: 17 (ref 10–24)
CALCIUM SERPL-MCNC: 9 MG/DL (ref 8.6–10.2)
CHLORIDE SERPL-SCNC: 106 MMOL/L (ref 96–106)
CO2 SERPL-SCNC: 24 MMOL/L (ref 20–29)
CREAT SERPL-MCNC: 0.93 MG/DL (ref 0.76–1.27)
EOSINOPHIL # BLD AUTO: 0.1 X10E3/UL (ref 0–0.4)
EOSINOPHIL NFR BLD AUTO: 1 %
ERYTHROCYTE [DISTWIDTH] IN BLOOD BY AUTOMATED COUNT: 13.8 % (ref 11.6–15.4)
FERRITIN SERPL-MCNC: 52 NG/ML (ref 30–400)
GLUCOSE SERPL-MCNC: 137 MG/DL (ref 65–99)
HBV CORE AB SERPL QL IA: NEGATIVE
HBV SURFACE AB SER QL: NON REACTIVE
HBV SURFACE AG SERPL QL IA: NEGATIVE
HCT VFR BLD AUTO: 43.4 % (ref 37.5–51)
HCV AB S/CO SERPL IA: <0.1 S/CO RATIO (ref 0–0.9)
HCV AB SERPL QL IA: NORMAL
HGB BLD-MCNC: 14.8 G/DL (ref 13–17.7)
IMM GRANULOCYTES # BLD AUTO: 0 X10E3/UL (ref 0–0.1)
IMM GRANULOCYTES NFR BLD AUTO: 0 %
INR PPP: 1 (ref 0.9–1.2)
IRON SERPL-MCNC: 89 UG/DL (ref 38–169)
LYMPHOCYTES # BLD AUTO: 0.4 X10E3/UL (ref 0.7–3.1)
LYMPHOCYTES NFR BLD AUTO: 6 %
MCH RBC QN AUTO: 30.3 PG (ref 26.6–33)
MCHC RBC AUTO-ENTMCNC: 34.1 G/DL (ref 31.5–35.7)
MCV RBC AUTO: 89 FL (ref 79–97)
MONOCYTES # BLD AUTO: 0.1 X10E3/UL (ref 0.1–0.9)
MONOCYTES NFR BLD AUTO: 2 %
NEUTROPHILS # BLD AUTO: 5.6 X10E3/UL (ref 1.4–7)
NEUTROPHILS NFR BLD AUTO: 90 %
PLATELET # BLD AUTO: 188 X10E3/UL (ref 150–450)
POTASSIUM SERPL-SCNC: 4.5 MMOL/L (ref 3.5–5.2)
PROT SERPL-MCNC: 6.7 G/DL (ref 6–8.5)
PROTHROMBIN TIME: 10.6 SEC (ref 9.1–12)
RBC # BLD AUTO: 4.89 X10E6/UL (ref 4.14–5.8)
SODIUM SERPL-SCNC: 142 MMOL/L (ref 134–144)
WBC # BLD AUTO: 6.3 X10E3/UL (ref 3.4–10.8)

## 2021-07-07 ENCOUNTER — HOSPITAL ENCOUNTER (OUTPATIENT)
Dept: MRI IMAGING | Age: 76
Discharge: HOME | End: 2021-07-07
Attending: INTERNAL MEDICINE
Payer: MEDICARE

## 2021-07-07 VITALS — WEIGHT: 185 LBS | BODY MASS INDEX: 26.54 KG/M2

## 2021-07-07 DIAGNOSIS — R10.11 RUQ PAIN: ICD-10-CM

## 2021-07-07 PROCEDURE — 74011250636 HC RX REV CODE- 250/636: Performed by: INTERNAL MEDICINE

## 2021-07-07 PROCEDURE — A9585 GADOBUTROL INJECTION: HCPCS | Performed by: INTERNAL MEDICINE

## 2021-07-07 PROCEDURE — 74183 MRI ABD W/O CNTR FLWD CNTR: CPT

## 2021-07-07 RX ADMIN — GADOBUTROL 8 ML: 604.72 INJECTION INTRAVENOUS at 09:22

## 2021-07-11 PROBLEM — Z98.890 HISTORY OF NISSEN FUNDOPLICATION: Status: ACTIVE | Noted: 2021-07-11

## 2021-08-03 ENCOUNTER — OFFICE VISIT (OUTPATIENT)
Dept: HEMATOLOGY | Age: 76
End: 2021-08-03
Payer: MEDICARE

## 2021-08-03 ENCOUNTER — TELEPHONE (OUTPATIENT)
Dept: HEMATOLOGY | Age: 76
End: 2021-08-03

## 2021-08-03 VITALS
BODY MASS INDEX: 26.4 KG/M2 | DIASTOLIC BLOOD PRESSURE: 83 MMHG | TEMPERATURE: 96.8 F | RESPIRATION RATE: 16 BRPM | HEART RATE: 67 BPM | WEIGHT: 184.4 LBS | HEIGHT: 70 IN | SYSTOLIC BLOOD PRESSURE: 143 MMHG

## 2021-08-03 DIAGNOSIS — R10.11 RUQ PAIN: Primary | ICD-10-CM

## 2021-08-03 DIAGNOSIS — K76.0 FATTY LIVER: ICD-10-CM

## 2021-08-03 PROCEDURE — G8536 NO DOC ELDER MAL SCRN: HCPCS | Performed by: INTERNAL MEDICINE

## 2021-08-03 PROCEDURE — 99214 OFFICE O/P EST MOD 30 MIN: CPT | Performed by: INTERNAL MEDICINE

## 2021-08-03 PROCEDURE — 1101F PT FALLS ASSESS-DOCD LE1/YR: CPT | Performed by: INTERNAL MEDICINE

## 2021-08-03 PROCEDURE — 91200 LIVER ELASTOGRAPHY: CPT | Performed by: INTERNAL MEDICINE

## 2021-08-03 PROCEDURE — G0463 HOSPITAL OUTPT CLINIC VISIT: HCPCS | Performed by: INTERNAL MEDICINE

## 2021-08-03 PROCEDURE — G8427 DOCREV CUR MEDS BY ELIG CLIN: HCPCS | Performed by: INTERNAL MEDICINE

## 2021-08-03 PROCEDURE — 3017F COLORECTAL CA SCREEN DOC REV: CPT | Performed by: INTERNAL MEDICINE

## 2021-08-03 PROCEDURE — G8510 SCR DEP NEG, NO PLAN REQD: HCPCS | Performed by: INTERNAL MEDICINE

## 2021-08-03 PROCEDURE — G8419 CALC BMI OUT NRM PARAM NOF/U: HCPCS | Performed by: INTERNAL MEDICINE

## 2021-08-03 RX ORDER — SENNOSIDES 25 MG/1
TABLET, FILM COATED ORAL
Status: ON HOLD | COMMUNITY
Start: 2021-07-27 | End: 2021-10-27

## 2021-08-03 RX ORDER — PREDNISONE 10 MG/1
TABLET ORAL
Status: ON HOLD | COMMUNITY
Start: 2021-07-26 | End: 2021-10-27

## 2021-08-03 NOTE — PROGRESS NOTES
Identified pt with two pt identifiers(name and ). Reviewed record in preparation for visit and have obtained necessary documentation. Chief Complaint   Patient presents with    Other     Fibroscan        Health Maintenance Due   Topic    COVID-19 Vaccine (1)    DTaP/Tdap/Td series (1 - Tdap)    Lipid Screen     Colorectal Cancer Screening Combo     Shingrix Vaccine Age 50> (1 of 2)    Pneumococcal 65+ years (1 of 1 - PPSV23)    Medicare Yearly Exam         Visit Vitals  BP (!) 143/83 (BP 1 Location: Right arm, BP Patient Position: Sitting, BP Cuff Size: Large adult)   Pulse 67   Temp 96.8 °F (36 °C) (Temporal)   Resp 16   Ht 5' 10\" (1.778 m)   Wt 184 lb 6.4 oz (83.6 kg)   BMI 26.46 kg/m²     Pain Scale: 4/10    Coordination of Care Questionnaire:  :   1. Have you been to the ER, urgent care clinic since your last visit? Hospitalized since your last visit? [facility] 2021 for spider bite and shingle    2. Have you seen or consulted any other health care providers outside of the [address] [place] [address] since your last visit? Include any pap smears or colon screening.  yes

## 2021-08-03 NOTE — PROGRESS NOTES
[address] [facility]      Jonel Kehr, MD, Stephenie Munoz, Reji Ortiz MD, MPH      CARROLL Elizondo, ACN-BC     Glenny Bailey, [facility]   Marya Hercules, FNP-C    Jacqueline Newton, [facility]       Glenis Larson Celso [address] 136    at [address], [address] [place] [address] Adali Molina  22.    [phone]    FAX: [address] [place] [address] Drive, [address], [address]    [phone]    FAX: [phone]       Patient Care Team:  Norma Nicole MD as PCP - General (Internal Medicine)  Norma Nicole MD as PCP - [facility]  Geena Foster MD as Physician (Cardiology)  Reginald Dickens MD as Physician (Cardiology)  Lemuel Quesada MD as Surgeon (General Surgery)  Adriel Casillas MD as Surgeon (General Surgery)      Problem List  Date Reviewed: 8/22/2021        Codes Class Noted    Fatty liver ICD-10-CM: K76.0  ICD-9-CM: 571.8  8/22/2021        Dilated cbd, acquired ICD-10-CM: K83.8  ICD-9-CM: 576.8  8/22/2021        History of Nissen fundoplication FZI-90-UJ: N57.346  ICD-9-CM: V15.29  7/11/2021        H/O shoulder surgery ICD-10-CM: Z98.890  ICD-9-CM: V45.89  3/14/2020        Tear of medial meniscus of left knee, current ICD-10-CM: Y06.348U  ICD-9-CM: 836.0  2/26/2020        SSS (sick sinus syndrome) ([facility] ICD-10-CM: I49.5  ICD-9-CM: 427.81  7/30/2015        Abnormal EKG ICD-10-CM: R94.31  ICD-9-CM: 794.31  6/24/2015        GERD (gastroesophageal reflux disease) ICD-10-CM: K21.9  ICD-9-CM: 530.81  1/31/2015              Jannet Banuelos is being seen at [address] for management of non-specific abdominal pain.   The active problem list, all pertinent past medical history, medications, endoscopic studies,   radiologic findings and laboratory findings related to the liver disorder were reviewed and discussed with the patient. The patient is a 77 y.o.  male who develoepd RUQ in 11/2020. Serologic evaluation for markers of chronic liver disease was negative. The most recent imaging of the liver was MRI performed in 7/2021. Results demonstrate mild dilation of the CBD and PD to the ampula without obvious mass or stones. Hepatic steatosis and liver cysts. .        Assessment of liver fibrosis with Fibroscan was performed in the office today. 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 The CAP score of 305 suggests hepatic steatosis. The patient has the following symptoms which could be attributed to the liver disorder:    fatigue,   pain in the right side over the liver,     The patient is not experiencing the following symptoms which are commonly seen in this liver disorder:   fevers,   chills,   nausea,   vomiting,     The patient completes all daily activities without any functional limitations. ASSESSMENT AND PLAN:  Fatty liver  Suspect the patient has fatty liver based upon imaging, Fiboscan CAP score, serologic studies that are negative for other causes of chronic liver disease,     A liver biopsy has not been performed. Fibroscan in 8/2021 demonstrated  10.0 kPa and  suggesting fatty liver and Stage 3 bridging fibrosis    Liver transaminases are normal.  ALP is normal.  Liver function is normal.  The platelet count is   normal.      Based upon laboratory studies Fibroscan, and imaging the patient may have advanced liver disease. The need to perform a liver biopsy to help determine the cause and severity of the liver test abnormalities was discussed. The risks of performing the liver biopsy including pain, puncture of the lung, gallbladder, intestine or kidney and bleeding were discussed.   Will defer liver biopsy for now pending evalaution of the dilated CBD and PD. Dilated CBD and PD  MRI and MRCP demonstrate mild dilation of the CBD and PD. There is no mass or stones. I have discussed this with Dr Esme Oviedo and we both agree that EUS to evaluate this area is warranted. Screening for Hepatocellular Carcinoma  HCC screening is not necessary if the patient has no evidence of cirrhosis. Treatment of other medical problems in patients with chronic liver disease  There are no contraindications for the patient to take most medications that are necessary for treatment of other medical issues. Counseling for alcohol in patients with chronic liver disease  The patient does not consume any significant amount of alcohol. Vaccinations   Routine vaccinations against other bacterial and viral agents can be performed as indicated. Annual flu vaccination should be administered if indicated. ALLERGIES  Allergies   Allergen Reactions    Latex Itching     AND HIVES    Sulfa (Sulfonamide Antibiotics) Anaphylaxis    Broccoli Nausea and Vomiting    Chlorhexidine Rash    Diltiazem Nausea Only    Gluten Other (comments)     \" WANTS TO SLEEP 24 HOURS A DAY , AND IT AFFECTS MY HEART\", PT CAN'T ELABORATE.  Ibuprofen Nausea and Vomiting    Lactose Other (comments) and Hives     EXTREME INDIGESTION  EXTREME INDIGESTION    Morphine Itching     Patient stated    Other Plant, Animal, Environmental Nausea and Vomiting     Pink food coloring    Percocet [Oxycodone-Acetaminophen] Itching    Propafenone Other (comments) and Nausea and Vomiting     Nausea and pain    Nausea and pain    Tapioca Nausea and Vomiting    Tramadol Nausea and Vomiting    Wheat Other (comments)     \" WANTS TO SLEEP 24 HOURS A DAY\"       MEDICATIONS  Current Outpatient Medications   Medication Sig    predniSONE (STERAPRED DS) 10 mg dose pack     lidocaine 5 % topical cream     testosterone (ANDROGEL) 1 % (25 mg/2.5gram) glpk 25 mg by TransDERmal route daily.     diphenhydramine HCl (ALLERGY PO) Take  by mouth as needed. No current facility-administered medications for this visit. SYSTEM REVIEW NOT RELATED TO LIVER DISEASE OR REVIEWED ABOVE:  Constitution systems: Negative for fever, chills, weight gain, weight loss. Eyes: Negative for visual changes. ENT: Negative for sore throat, painful swallowing. Respiratory: Negative for cough, hemoptysis, SOB. Cardiology: Negative for chest pain, palpitations. GI:  Negative for constipation or diarrhea. : Negative for urinary frequency, dysuria, hematuria, nocturia. Skin: Negative for rash. Hematology: Negative for easy bruising, blood clots. Musculo-skelatal: Negative for back pain, muscle pain, weakness. Neurologic: Negative for headaches, dizziness, vertigo, memory problems not related to HE. Psychology: Negative for anxiety, depression. FAMILY HISTORY:  The father  of colon cancer. The mother  at age 80 yers. There is no family history of liver disease. SOCIAL HISTORY:  The patient is . The patient has 1 child. The patient has never used tobacco products. The patient has never consumed significant amounts of alcohol. The patient used to work as  for [employer] . PHYSICAL EXAMINATION:  Visit Vitals  BP (!) 143/83 (BP 1 Location: Right arm, BP Patient Position: Sitting, BP Cuff Size: Large adult)   Pulse 67   Temp 96.8 °F (36 °C) (Temporal)   Resp 16   Ht 5' 10\" (1.778 m)   Wt 184 lb 6.4 oz (83.6 kg)   BMI 26.46 kg/m²     General: No acute distress. Eyes: Sclera anicteric. ENT: No oral lesions. Thyroid normal.  Nodes: No adenopathy. Skin: No spider angiomata. No jaundice. No palmar erythema. Respiratory: Lungs clear to auscultation. Cardiovascular: Regular heart rate. No murmurs. No JVD. Abdomen: Soft non-tender. Liver size normal to percussion/palpation. Spleen not palpable. No obvious ascites. Extremities: No edema. No muscle wasting.   No gross arthritic changes. Neurologic: Alert and oriented. Cranial nerves grossly intact. No asterixis. LABORATORY STUDIES:  Liver [place] of [address] [place] [address] Units 7/1/2021   WBC 3.4 - 10.8 x10E3/uL 6.3   ANC 1.4 - 7.0 x10E3/uL 5.6   HGB 13.0 - 17.7 g/dL 14.8    - 450 x10E3/uL 188   INR 0.9 - 1.2 1.0   AST 0 - 40 IU/L 11   ALT 0 - 44 IU/L 12   Alk Phos 48 - 121 IU/L 65   Bili, Total 0.0 - 1.2 mg/dL 0.5   Bili, Direct 0.00 - 0.40 mg/dL 0.13   Albumin 3.7 - 4.7 g/dL 4.6   BUN 8 - 27 mg/dL 16   Creat 0.76 - 1.27 mg/dL 0.93   Creat (iSTAT) 0.6 - 1.3 mg/dL    Na 134 - 144 mmol/L 142   K 3.5 - 5.2 mmol/L 4.5   Cl 96 - 106 mmol/L 106   CO2 20 - 29 mmol/L 24   Glucose 65 - 99 mg/dL 137 (H)     SEROLOGIES:  Serologies Latest Ref Rng & Units 7/1/2021   Hep B Surface Ag Negative Negative   Hep B Core Ab, Total Negative Negative   Hep B Surface AB QL  Non Reactive   Hep C Ab 0.0 - 0.9 s/co ratio <0.1   Ferritin 30 - 400 ng/mL 52   Alpha-1 antitrypsin level 101 - 187 mg/dL 115     LIVER HISTOLOGY:  Not available or performed    ENDOSCOPIC PROCEDURES:  Not available or performed    RADIOLOGY:  11/2020. CT scan abdomen with IV contrast.  NOrmal appearing liver. Severllow density lesions in liver. The largest is 2.2 x 1.4 cm. Most likely cysts. 7/2021. MRI and MRCP. Changes consistent with fatty liver. No liver mass lesions. Mild dilation of CBD and PD. Normal spleen. No ascites. OTHER TESTING:  Not available or performed    FOLLOW-UP:  All of the issues listed above in the Assessment and Plan were discussed with the patient. All questions were answered. The patient expressed a clear understanding of the above. Follow-up [address] 32 in after the EUS and possible ERCP.         [facility] of [address], [address] [facility], [facility] 22.  [phone]  [address] [facility]

## 2021-08-03 NOTE — TELEPHONE ENCOUNTER
Patient wanted to know what the name of the [facility] physician was that  wants patient to see as it was not specified during visit and/or patient forgot what it was.

## 2021-08-03 NOTE — Clinical Note
8/22/2021    Patient: Tami Banuelos   YOB: 1945   Date of Visit: 8/3/2021     Jan Dillon MD  [address] [place] [id number]  Via Fax: [phone]    Dear Jan Dillon MD,      Thank you for referring Mr. Octavio Banuelos to [address] for evaluation. My notes for this consultation are attached. If you have questions, please do not hesitate to call me. I look forward to following your patient along with you.       Sincerely,    Khalida Alfaro MD

## 2021-08-03 NOTE — TELEPHONE ENCOUNTER
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 Had sofia and hernia repair. Did not help. He came to me because of liver cysts. MRI shows mild dilation of CBD and PD without obvious mass or stone. I suspect he needs EUS followed by dilation of sphincter. Sherry from my office will make appt for you to see him. He has seen Camilo Max in past.  MARIVEL [email or web address] Called [facility] [phone] to make appt for patient with Dr. Chrissy Carrington. [facility] staff says patient has seen Dr. Duran Llanos in the past, so she will need to sent message over to Dr. Delia Alfaro nurse for this new referral then there office will contact the patient. (PHILLIP)   [phone]) Dr. Delia Alfaro office has called patient --to make aware that there office will be setting up appt soon. Patient called our office back to make aware. (KF)

## 2021-08-09 ENCOUNTER — DOCUMENTATION ONLY (OUTPATIENT)
Dept: HEMATOLOGY | Age: 76
End: 2021-08-09

## 2021-08-09 NOTE — PROGRESS NOTES
Office note, labs, and imaging were faxed to Office of Dr. Niurka Zuleta for scheduling per referral

## 2021-08-11 ENCOUNTER — TELEPHONE (OUTPATIENT)
Dept: HEMATOLOGY | Age: 76
End: 2021-08-11

## 2021-08-11 NOTE — TELEPHONE ENCOUNTER
Patient called indicating issues regarding the referral to Dr. Darcie Vallejo office. Patient was previously established with Dr. Matt Encarnacion but patient had a bad experience with provider and does not want to see them. When asked to see Dr. Johanne Hernandez, per the patient the office was giving him the runaround about trying to set up an appointment. Out of frustration, patient would like for Dr. Julissa Ashby to advise patient of a completely different office and provider to refer him to considering his experience.

## 2021-08-17 NOTE — TELEPHONE ENCOUNTER
Patient is calling back again to obtain another provider to be referred to instead of the office of Dr. Vanessa Colón

## 2021-08-18 NOTE — TELEPHONE ENCOUNTER
[email or web address] [facility] talk with Jenni Lee --what was told to me by Soumya--patient will need to sign of release form of some sort to be released from Dr. Jacinda Sutton before Dr. Tunde Ye will be able to see this patient. Spoke w/patient nobody told him about a form. Pt states \"I have called over to that office x5 times in everyone says something different on the phone. I just scared to go there because communication is missed in that office. \" Jenni Lee states 'I will get the  to call you back concerning this patient. Patient would like Dr. Charlie Wilkins to refer him to another office. Will print encounter in give to MLS in the morning. [email or web address] Discuss w/Dr. Charlie Wilkins. He will call over to Hua in take care of the situation. [email or web address] Closure Per Dr. Charlie Wilkins this case has been taking care of. (KF)

## 2021-08-22 PROBLEM — Z98.890 H/O UMBILICAL HERNIA REPAIR: Status: ACTIVE | Noted: 2021-08-22

## 2021-08-22 PROBLEM — K83.8 DILATED CBD, ACQUIRED: Status: ACTIVE | Noted: 2021-08-22

## 2021-08-22 PROBLEM — Z87.19 H/O UMBILICAL HERNIA REPAIR: Status: ACTIVE | Noted: 2021-08-22

## 2021-08-22 PROBLEM — K76.0 FATTY LIVER: Status: ACTIVE | Noted: 2021-08-22

## 2021-08-23 ENCOUNTER — TELEPHONE (OUTPATIENT)
Dept: HEMATOLOGY | Age: 76
End: 2021-08-23

## 2021-08-25 ENCOUNTER — TELEPHONE (OUTPATIENT)
Dept: HEMATOLOGY | Age: 76
End: 2021-08-25

## 2021-08-25 NOTE — TELEPHONE ENCOUNTER
[email or web address] Spoke w/patient concerning appt with Dr. Favian Fischer. Dr. Magy Brown has spoke with Shana Driscoll twice concerning the need for EUS for this patient. I spoke with [facility] staff and the information has been turned over to the  to follow up with patient appt. Left patient detail message with all the information. Ritu Mock)      [email or web address] Spoke w/I office--Linnea again trying to get information on patient status for appt in there office. Patient will need to see Drucie Harada, NP first and at this time the manager continue s to work on a next available appt in they will call the patient this week. At this time we have to wait on [facility] to call the patient--my hands are tied at this time. (KF)    [email or web address] Called over to [facility] to talk w/Delmy who has been handle this patient case. [facility] spoke w/patient on 8/31/21 concerning appt per [facility] office staff. Dr. Favian Fischer is out of the office this week, so they trying to get appt with NP. Still pending appt but the office has talk w/patient. (PHILLIP)    [email or web address] Patient has appt now with Shana Driscoll on 9/27/21 @ 1030 am. Spoke w/patient he is aware of appt/date/time. (KF)

## 2021-09-20 NOTE — TELEPHONE ENCOUNTER
----- Message from Aleena Marques MD sent at 8/22/2021  9:06 AM EDT -----  Regarding: I spoke to Dr Tsosie Ganser about him and he will arrange EUS.   Schedule FU appt with me 1-2 weeks after EUS

## 2021-10-11 ENCOUNTER — TRANSCRIBE ORDER (OUTPATIENT)
Dept: REGISTRATION | Age: 76
End: 2021-10-11

## 2021-10-11 ENCOUNTER — HOSPITAL ENCOUNTER (OUTPATIENT)
Dept: PREADMISSION TESTING | Age: 76
Discharge: HOME | End: 2021-10-11
Payer: MEDICARE

## 2021-10-11 DIAGNOSIS — Z01.812 PRE-PROCEDURE LAB EXAM: ICD-10-CM

## 2021-10-11 DIAGNOSIS — Z01.812 PRE-PROCEDURE LAB EXAM: Primary | ICD-10-CM

## 2021-10-11 PROCEDURE — U0005 INFEC AGEN DETEC AMPLI PROBE: HCPCS

## 2021-10-12 LAB
SARS-COV-2, XPLCVT: NOT DETECTED
SOURCE, COVRS: NORMAL

## 2021-10-15 ENCOUNTER — TELEPHONE (OUTPATIENT)
Dept: HEMATOLOGY | Age: 76
End: 2021-10-15

## 2021-10-15 NOTE — TELEPHONE ENCOUNTER
The patient is scheduled for an EUS on 10/27/21 howewver, we had to reschedule his fibroscan originally scheduled for 11/24/21 with April to 1/24/2022. The patient does not want to wait until the January visit with April to discuss his EUS. Per Oralia Abernathy, we advised the patient that we would have you call the patient after his 10/27/21 to discuss that procedure.

## 2021-10-15 NOTE — TELEPHONE ENCOUNTER
[email or web address] Information noted from below. (KF)      [email or web address] Spoke w/patient concerning Dr. Fortino Curry message that was sent below. Patient verbalize understanding that Dr. Colletta Felts will take care of his EUS and if follow up is need.  Patient is to keep follow up appt in Jan 2022 but if anything is needed before  will work him in the schedule

## 2021-10-22 ENCOUNTER — TRANSCRIBE ORDER (OUTPATIENT)
Dept: REGISTRATION | Age: 76
End: 2021-10-22

## 2021-10-22 ENCOUNTER — HOSPITAL ENCOUNTER (OUTPATIENT)
Dept: PREADMISSION TESTING | Age: 76
Discharge: HOME | End: 2021-10-22
Payer: MEDICARE

## 2021-10-22 DIAGNOSIS — Z01.812 PRE-PROCEDURE LAB EXAM: ICD-10-CM

## 2021-10-22 DIAGNOSIS — Z01.812 PRE-PROCEDURE LAB EXAM: Primary | ICD-10-CM

## 2021-10-22 PROCEDURE — U0005 INFEC AGEN DETEC AMPLI PROBE: HCPCS

## 2021-10-23 LAB
SARS-COV-2, XPLCVT: NOT DETECTED
SOURCE, COVRS: NORMAL

## 2021-10-27 ENCOUNTER — ANESTHESIA EVENT (OUTPATIENT)
Dept: ENDOSCOPY | Age: 76
End: 2021-10-27
Payer: MEDICARE

## 2021-10-27 ENCOUNTER — APPOINTMENT (OUTPATIENT)
Dept: ULTRASOUND IMAGING | Age: 76
End: 2021-10-27
Attending: INTERNAL MEDICINE
Payer: MEDICARE

## 2021-10-27 ENCOUNTER — HOSPITAL ENCOUNTER (OUTPATIENT)
Age: 76
Setting detail: OUTPATIENT SURGERY
Discharge: HOME | End: 2021-10-27
Attending: INTERNAL MEDICINE | Admitting: INTERNAL MEDICINE
Payer: MEDICARE

## 2021-10-27 ENCOUNTER — ANESTHESIA (OUTPATIENT)
Dept: ENDOSCOPY | Age: 76
End: 2021-10-27
Payer: MEDICARE

## 2021-10-27 VITALS
HEIGHT: 70 IN | WEIGHT: 185 LBS | RESPIRATION RATE: 15 BRPM | TEMPERATURE: 98.9 F | SYSTOLIC BLOOD PRESSURE: 116 MMHG | HEART RATE: 65 BPM | DIASTOLIC BLOOD PRESSURE: 75 MMHG | OXYGEN SATURATION: 95 % | BODY MASS INDEX: 26.48 KG/M2

## 2021-10-27 PROCEDURE — 74011250636 HC RX REV CODE- 250/636: Performed by: STUDENT IN AN ORGANIZED HEALTH CARE EDUCATION/TRAINING PROGRAM

## 2021-10-27 PROCEDURE — 74011000250 HC RX REV CODE- 250: Performed by: STUDENT IN AN ORGANIZED HEALTH CARE EDUCATION/TRAINING PROGRAM

## 2021-10-27 PROCEDURE — 2709999900 HC NON-CHARGEABLE SUPPLY: Performed by: INTERNAL MEDICINE

## 2021-10-27 PROCEDURE — 76060000032 HC ANESTHESIA 0.5 TO 1 HR: Performed by: INTERNAL MEDICINE

## 2021-10-27 PROCEDURE — 76040000007: Performed by: INTERNAL MEDICINE

## 2021-10-27 RX ORDER — SODIUM CHLORIDE 0.9 % (FLUSH) 0.9 %
5-40 SYRINGE (ML) INJECTION EVERY 8 HOURS
Status: DISCONTINUED | OUTPATIENT
Start: 2021-10-27 | End: 2021-10-27 | Stop reason: HOSPADM

## 2021-10-27 RX ORDER — NALOXONE HYDROCHLORIDE 0.4 MG/ML
0.4 INJECTION, SOLUTION INTRAMUSCULAR; INTRAVENOUS; SUBCUTANEOUS
Status: DISCONTINUED | OUTPATIENT
Start: 2021-10-27 | End: 2021-10-27 | Stop reason: HOSPADM

## 2021-10-27 RX ORDER — SODIUM CHLORIDE 0.9 % (FLUSH) 0.9 %
5-40 SYRINGE (ML) INJECTION AS NEEDED
Status: DISCONTINUED | OUTPATIENT
Start: 2021-10-27 | End: 2021-10-27 | Stop reason: HOSPADM

## 2021-10-27 RX ORDER — SODIUM CHLORIDE, SODIUM LACTATE, POTASSIUM CHLORIDE, CALCIUM CHLORIDE 600; 310; 30; 20 MG/100ML; MG/100ML; MG/100ML; MG/100ML
INJECTION, SOLUTION INTRAVENOUS
Status: DISCONTINUED | OUTPATIENT
Start: 2021-10-27 | End: 2021-10-27 | Stop reason: HOSPADM

## 2021-10-27 RX ORDER — GLYCOPYRROLATE 0.2 MG/ML
INJECTION INTRAMUSCULAR; INTRAVENOUS AS NEEDED
Status: DISCONTINUED | OUTPATIENT
Start: 2021-10-27 | End: 2021-10-27 | Stop reason: HOSPADM

## 2021-10-27 RX ORDER — EPINEPHRINE 0.1 MG/ML
1 INJECTION INTRACARDIAC; INTRAVENOUS
Status: DISCONTINUED | OUTPATIENT
Start: 2021-10-27 | End: 2021-10-27 | Stop reason: HOSPADM

## 2021-10-27 RX ORDER — FLUMAZENIL 0.1 MG/ML
0.2 INJECTION INTRAVENOUS
Status: DISCONTINUED | OUTPATIENT
Start: 2021-10-27 | End: 2021-10-27 | Stop reason: HOSPADM

## 2021-10-27 RX ORDER — ATROPINE SULFATE 0.1 MG/ML
0.5 INJECTION INTRAVENOUS
Status: DISCONTINUED | OUTPATIENT
Start: 2021-10-27 | End: 2021-10-27 | Stop reason: HOSPADM

## 2021-10-27 RX ORDER — DIPHENHYDRAMINE HYDROCHLORIDE 50 MG/ML
50 INJECTION, SOLUTION INTRAMUSCULAR; INTRAVENOUS ONCE
Status: DISCONTINUED | OUTPATIENT
Start: 2021-10-27 | End: 2021-10-27 | Stop reason: HOSPADM

## 2021-10-27 RX ORDER — FENTANYL CITRATE 50 UG/ML
100 INJECTION, SOLUTION INTRAMUSCULAR; INTRAVENOUS
Status: DISCONTINUED | OUTPATIENT
Start: 2021-10-27 | End: 2021-10-27 | Stop reason: HOSPADM

## 2021-10-27 RX ORDER — PHENYLEPHRINE HCL IN 0.9% NACL 0.4MG/10ML
SYRINGE (ML) INTRAVENOUS AS NEEDED
Status: DISCONTINUED | OUTPATIENT
Start: 2021-10-27 | End: 2021-10-27 | Stop reason: HOSPADM

## 2021-10-27 RX ORDER — LIDOCAINE HYDROCHLORIDE 20 MG/ML
INJECTION, SOLUTION EPIDURAL; INFILTRATION; INTRACAUDAL; PERINEURAL AS NEEDED
Status: DISCONTINUED | OUTPATIENT
Start: 2021-10-27 | End: 2021-10-27 | Stop reason: HOSPADM

## 2021-10-27 RX ORDER — SODIUM CHLORIDE 9 MG/ML
100 INJECTION, SOLUTION INTRAVENOUS CONTINUOUS
Status: DISCONTINUED | OUTPATIENT
Start: 2021-10-27 | End: 2021-10-27 | Stop reason: HOSPADM

## 2021-10-27 RX ORDER — MIDAZOLAM HYDROCHLORIDE 1 MG/ML
.25-1 INJECTION, SOLUTION INTRAMUSCULAR; INTRAVENOUS
Status: DISCONTINUED | OUTPATIENT
Start: 2021-10-27 | End: 2021-10-27 | Stop reason: HOSPADM

## 2021-10-27 RX ORDER — PROPOFOL 10 MG/ML
INJECTION, EMULSION INTRAVENOUS AS NEEDED
Status: DISCONTINUED | OUTPATIENT
Start: 2021-10-27 | End: 2021-10-27 | Stop reason: HOSPADM

## 2021-10-27 RX ORDER — DEXTROMETHORPHAN/PSEUDOEPHED 2.5-7.5/.8
1.2 DROPS ORAL
Status: DISCONTINUED | OUTPATIENT
Start: 2021-10-27 | End: 2021-10-27 | Stop reason: HOSPADM

## 2021-10-27 RX ADMIN — GLYCOPYRROLATE 0.2 MG: 0.2 INJECTION, SOLUTION INTRAMUSCULAR; INTRAVENOUS at 15:09

## 2021-10-27 RX ADMIN — PROPOFOL 50 MG: 10 INJECTION, EMULSION INTRAVENOUS at 15:14

## 2021-10-27 RX ADMIN — PROPOFOL 50 MG: 10 INJECTION, EMULSION INTRAVENOUS at 15:11

## 2021-10-27 RX ADMIN — GLYCOPYRROLATE 0.2 MG: 0.2 INJECTION, SOLUTION INTRAMUSCULAR; INTRAVENOUS at 15:22

## 2021-10-27 RX ADMIN — PROPOFOL 50 MG: 10 INJECTION, EMULSION INTRAVENOUS at 15:23

## 2021-10-27 RX ADMIN — LIDOCAINE HYDROCHLORIDE 60 MG: 20 INJECTION, SOLUTION EPIDURAL; INFILTRATION; INTRACAUDAL; PERINEURAL at 15:09

## 2021-10-27 RX ADMIN — Medication 80 MCG: at 15:22

## 2021-10-27 RX ADMIN — PROPOFOL 50 MG: 10 INJECTION, EMULSION INTRAVENOUS at 15:28

## 2021-10-27 RX ADMIN — PROPOFOL 50 MG: 10 INJECTION, EMULSION INTRAVENOUS at 15:32

## 2021-10-27 RX ADMIN — SODIUM CHLORIDE, POTASSIUM CHLORIDE, SODIUM LACTATE AND CALCIUM CHLORIDE: 600; 310; 30; 20 INJECTION, SOLUTION INTRAVENOUS at 15:08

## 2021-10-27 RX ADMIN — PROPOFOL 50 MG: 10 INJECTION, EMULSION INTRAVENOUS at 15:18

## 2021-10-27 NOTE — ANESTHESIA PREPROCEDURE EVALUATION
Relevant Problems   CARDIOVASCULAR   (+) SSS (sick sinus syndrome) (HCC)      GASTROINTESTINAL   (+) Fatty liver   (+) GERD (gastroesophageal reflux disease)       Anesthetic History          Comments: \"almost didn't come out of sinus surgery\"     Review of Systems / Medical History  Patient summary reviewed, nursing notes reviewed and pertinent labs reviewed    Pulmonary  Within defined limits                 Neuro/Psych   Within defined limits           Cardiovascular            Dysrhythmias       Exercise tolerance: >4 METS  Comments: H/o SSS??   GI/Hepatic/Renal     GERD: well controlled      Liver disease (Fatty liver)     Endo/Other  Within defined limits      Arthritis     Other Findings              Physical Exam    Airway  Mallampati: II  TM Distance: 4 - 6 cm  Neck ROM: normal range of motion   Mouth opening: Normal     Cardiovascular  Regular rate and rhythm,  S1 and S2 normal,  no murmur, click, rub, or gallop             Dental  No notable dental hx       Pulmonary  Breath sounds clear to auscultation               Abdominal  GI exam deferred       Other Findings            Anesthetic Plan    ASA: 3  Anesthesia type: MAC          Induction: Intravenous  Anesthetic plan and risks discussed with: Patient

## 2021-10-27 NOTE — ANESTHESIA POSTPROCEDURE EVALUATION
Procedure(s):  LINEAR ENDOSCOPIC ULTRASOUND (EUS) WITH FNA   :-  ESOPHAGOGASTRODUODENOSCOPY (EGD). MAC    Anesthesia Post Evaluation      Multimodal analgesia: multimodal analgesia not used between 6 hours prior to anesthesia start to PACU discharge  Patient location during evaluation: PACU  Patient participation: complete - patient participated  Level of consciousness: awake  Pain score: 0  Pain management: adequate  Airway patency: patent  Anesthetic complications: no  Cardiovascular status: acceptable  Respiratory status: acceptable  Hydration status: acceptable  Comments: I have evaluated the patient and meets criteria for discharge from PACU. Alla Reno MD  Post anesthesia nausea and vomiting:  controlled  Final Post Anesthesia Temperature Assessment:  Normothermia (36.0-37.5 degrees C)      INITIAL Post-op Vital signs:   Vitals Value Taken Time   /75 10/27/21 1600   Temp 37.2 °C (98.9 °F) 10/27/21 1545   Pulse 61 10/27/21 1605   Resp 10 10/27/21 1605   SpO2 96 % 10/27/21 1605   Vitals shown include unvalidated device data.

## 2021-10-27 NOTE — H&P
[address]  [address] [facility], [address] [place] [address]      History and Physical       NAME:  Fariba Banuelos   :   1945   MRN:   [id number]             History of Present Illness:  Patient is a 77 y.o. who is seen for dilated bile duct and dilated pancreatic duct. PMH:  Past Medical History:   Diagnosis Date    Adverse effect of anesthesia     reaction to anesthesia/took two months to come out of it/\"almost killed me\"    Anemia     CFS (chronic fatigue syndrome)     GERD (gastroesophageal reflux disease)     Rotator cuff disorder     right       PSH:  Past Surgical History:   Procedure Laterality Date    HX APPENDECTOMY      HX COLONOSCOPY      HX HEENT  2014    SINUS SURGERY- \"CLEANING OUT\" INFECTION OF SINUSES    HX HERNIA REPAIR Right     INGUINAL    HX HERNIA REPAIR  2017    Laparoscopic paraesophageal hernia repair with mesh     HX LAP CHOLECYSTECTOMY  2017    HX ORTHOPAEDIC Right     SHOULDER REPAIR ( PT DOESN'T KNOW)     HX ORTHOPAEDIC  1964     BONE SPURS/NODULE REMOVED NEAR LEFT FEMUR    HX RETINAL DETACHMENT REPAIR Left     HX ROTATOR CUFF REPAIR Right 2017    HX TONSILLECTOMY         Allergies: Allergies   Allergen Reactions    Latex Itching     AND HIVES    Sulfa (Sulfonamide Antibiotics) Anaphylaxis    Broccoli Nausea and Vomiting    Chlorhexidine Rash    Diltiazem Nausea Only    Gluten Other (comments)     \" WANTS TO SLEEP 24 HOURS A DAY , AND IT AFFECTS MY HEART\", PT CAN'T ELABORATE.      Ibuprofen Nausea and Vomiting    Lactose Other (comments) and Hives     EXTREME INDIGESTION  EXTREME INDIGESTION    Morphine Itching     Patient stated    Other Plant, Animal, Environmental Nausea and Vomiting     Pink food coloring    Percocet [Oxycodone-Acetaminophen] Itching    Propafenone Other (comments) and Nausea and Vomiting     Nausea and pain    Nausea and pain    Tapioca Nausea and Vomiting    Tramadol Nausea and Vomiting  Wheat Other (comments)     \" WANTS TO SLEEP 24 HOURS A DAY\"       Home Medications:  Prior to Admission Medications   Prescriptions Last Dose Informant Patient Reported? Taking? diphenhydramine HCl (ALLERGY PO) Unknown at Unknown time  Yes No   Sig: Take  by mouth as needed. testosterone (ANDROGEL) 1 % (25 mg/2.5gram) [facility] 10/25/2021  Yes No   Si mg by TransDERmal route daily.       Facility-Administered Medications: None       Hospital Medications:  Current Facility-Administered Medications   Medication Dose Route Frequency    midazolam (VERSED) injection 0.25-10 mg  0.25-10 mg IntraVENous Multiple    fentaNYL citrate (PF) injection 100 mcg  100 mcg IntraVENous Multiple    naloxone (NARCAN) injection 0.4 mg  0.4 mg IntraVENous Multiple    flumazeniL (ROMAZICON) 0.1 mg/mL injection 0.2 mg  0.2 mg IntraVENous Multiple    simethicone (MYLICON) 66VB/6.8JI oral drops 80 mg  1.2 mL Oral Multiple    diphenhydrAMINE (BENADRYL) injection 50 mg  50 mg IntraVENous ONCE    atropine injection 0.5 mg  0.5 mg IntraVENous ONCE PRN    EPINEPHrine (ADRENALIN) 0.1 mg/mL syringe 1 mg  1 mg Endoscopically ONCE PRN       Social History:  Social History     Tobacco Use    Smoking status: Never Smoker    Smokeless tobacco: Never Used   Substance Use Topics    Alcohol use: Yes     Comment: RARELY       Family History:  Family History   Problem Relation Age of Onset    Hypertension Mother     Stroke Mother     Diabetes Mother     Heart Disease Mother     Cancer Father         ABDOMINAL CANCER    Anesth Problems Neg Hx              Review of Systems:      Constitutional: negative fever, negative chills, negative weight loss  Eyes:   negative visual changes  ENT:   negative sore throat, tongue or lip swelling  Respiratory:  negative cough, negative dyspnea  Cards:  negative for chest pain, palpitations, lower extremity edema  GI:   See HPI  :  negative for frequency, dysuria  Integument:  negative for rash and pruritus  Heme:  negative for easy bruising and gum/nose bleeding  Musculoskel: negative for myalgias,  back pain and muscle weakness  Neuro: negative for headaches, dizziness, vertigo  Psych:  negative for feelings of anxiety, depression       Objective:     Patient Vitals for the past 8 hrs:   Height Weight   10/27/21 1400 5' 10\" (1.778 m) 83.9 kg (185 lb)     No intake/output data recorded. No intake/output data recorded. EXAM:     NEURO-a&o   HEENT-wnl   LUNGS-clear    COR-regular rate and rhythym     ABD-soft , no tenderness, no rebound, good bs     EXT-no edema     Data Review     No results for input(s): WBC, HGB, HCT, PLT, HGBEXT, HCTEXT, PLTEXT in the last 72 hours. No results for input(s): NA, K, CL, CO2, BUN, CREA, GLU, PHOS, CA in the last 72 hours. No results for input(s): AP, TBIL, TP, ALB, GLOB, GGT, AML, LPSE in the last 72 hours. No lab exists for component: SGOT, GPT, AMYP, HLPSE  No results for input(s): INR, PTP, APTT, INREXT in the last 72 hours. Assessment:     · Dilated bile duct  · Dilated pancreatic duct     Patient Active Problem List   Diagnosis Code    GERD (gastroesophageal reflux disease) K21.9    Abnormal EKG R94.31    SSS (sick sinus syndrome) ([facility]) I49.5    Tear of medial meniscus of left knee, current S83.242A    H/O shoulder surgery Z98.890    History of Nissen fundoplication B36.382    Fatty liver K76.0    Dilated cbd, acquired L85.3    H/O umbilical hernia repair U06.250, Z87.19     Plan:   · The patient was counseled at length about the risks of kimberli Covid-19 in the ronda-operative and post-operative states including the recovery window of their procedure. The patient was made aware that kimberli Covid-19 after a surgical procedure may worsen their prognosis for recovering from the virus and lend to a higher morbidity and or mortality risk. The patient was given the options of postponing their procedure.  All of the risks, benefits, and alternatives were discussed. The patient does wish to proceed with the procedure. · Endoscopic procedure with MAC     Signed By: Faby Villela.  Bryson Day MD     10/27/2021  2:58 PM

## 2021-10-27 NOTE — PROCEDURES
[address] [place] [address]       Endoscopic Ultrasound    NAME:  Yesenia Banuelos   :   1945   MRN:   [id number]       Procedure Type: Endoscopic Ultrasound    Indications: abnormal MRCP showing dilated bile duct and dilated main pancreatic duct    Pre-operative Diagnosis: see indication above    Post-operative Diagnosis:  See findings below    : Dilia Martinez. Kinga Ellis MD    Staff: Endoscopy Sherman Sake: Kevin Mckeon  Endoscopy RN-1: Tatyana Aquino RN     Referring Provider: Baylee Montgomery. MD SHAWN, -Antonina Maria MD    Anethesia/Sedation:  MAC anesthesia Propofol      Procedure Details     After informed consent was obtained for the procedure, with all risks and benefits of procedure explained the patient was taken to the endoscopy suite and placed in the left lateral decubitus position. Following sequential administration of sedation as per above, an EGD was performed. Findings are listed below. Next, the linear echoendoscope was inserted into the mouth and advanced under direct vision to the second portion of the duodenum. Findings:     Endoscopic:   Esophagus:normal   Stomach: 3 cm hiatal hernia, otherwise normal appearing stomach   Duodenum: normal to second portion, normal appearing tangential view of the ampulla. No mass or polyp seen. Spontaneous biliary drainage was seen. Ultrasound:      Pancreas:     Areas examined: the entire gland    Parenchyma: The pancreatic parenchyma was atrophic appearing. There was lobularity, few hyperchoic foci, and main PD was maximally dilated at 7 mm. This was in the region of the head. In the body/tail, the PD was 3.3 mm. Hyperechoic rim was seen. No stones were seen.          Pancreatic Duct: as above   Liver:     Parenchyma: visualized portions were normal appearing   Gallbladder: absent   Bile Duct: 8 mm, no wall thickening, stones or sludge   Lymph Node: no pathologically enlarged lymph nodes         Specimen Removed:  none    Complications: None. EBL:  None. Interventions: see above    Impression:  1. Parenchymal atrophy, parenchymal lobularity, few hyperechoic foci, and dilated main pancreatic duct suggestive of chronic pancreatitis. Patient does not have a known history of chronic pancreatitis or recurrent acute pancreatitis. Less likely explanation for dilated main pancreatic duct is main duct IPMN  2. Bile duct 8 mm in diameter in post-cholecystectomy state  3. No mucosal abnormality seen     Recommendations:   1. Fecal elastase  2. It is unclear if his abdominal pain is explained by the above findings or separately related to his post-operative state. I have advised that he follow up with Surgery  3. No indication for ERCP at this time  4. CT abdomen with IV contrast (pancreatic protocol) in 3-6 months    Signed By: Andrea Treviño.  Claudine Felix MD     10/27/2021  3:38 PM

## 2021-10-27 NOTE — DISCHARGE INSTRUCTIONS
[address]  [address]    Endoscopic ultrasound DISCHARGE INSTRUCTIONS    Librado Apley  [id number]  1945    Discomfort:  Sore throat- throat lozenges or warm salt water gargle  redness at IV site- apply warm compress to area; if redness or soreness persist- contact your physician  Gaseous discomfort- walking, belching will help relieve any discomfort  You may not operate a vehicle for 12 hours  You may not engage in an occupation involving machinery or appliances for rest of today  You may not drink alcoholic beverages for at least 12 hours  Avoid making any critical decisions for at least 24 hour  DIET  You may eat and drink now and after you leave. You may resume your regular diet - however -  remember your colon is empty and a heavy meal will produce gas. Avoid these foods:  vegetables, fried / greasy foods, carbonated drinks    ACTIVITY  You may resume your normal daily activities   Spend the remainder of the day resting -  avoid any strenuous activity. CALL M.D. ANY SIGN OF   Increasing pain, nausea, vomiting  Abdominal distension (swelling)  New increased bleeding (oral or rectal)  Fever (chills)  Pain in chest area  Bloody discharge from nose or mouth  Shortness of breath    Follow-up Instructions:   Call Dr. Bertha Araujo for any questions or problems. Telephone # [phone]    ENDOSCOPY FINDINGS:   Your endoscopic ultrasound showed evidence of chronic inflammation in the pancreas. For this, my office will arrange for you to submit a stool test to evaluate the digestive function of your pancreas. I would like for you to have a CT scan of your pancreas in 3-6 months and my office will arrange for this. Please follow up with the surgery clinic regarding abdominal pain that may be related to the surgical mesh in place. Signed By: Faby Villela.  Bryson Day MD     10/27/2021  3:52 PM         Learning About Coronavirus (COVID-19)  Coronavirus (COVID-19): Overview  What is coronavirus (MNUGQ-20)? The coronavirus disease (COVID-19) is caused by a virus. It is an illness that was first found in [place], [place], in December 2019. It has since spread worldwide. The virus can cause fever, cough, and trouble breathing. In severe cases, it can cause pneumonia and make it hard to breathe without help. It can cause death. Coronaviruses are a large group of viruses. They cause the common cold. They also cause more serious illnesses like Middle East respiratory syndrome (MERS) and severe acute respiratory syndrome (SARS). COVID-19 is caused by a novel coronavirus. That means it's a new type that has not been seen in people before. This virus spreads person-to-person through droplets from coughing and sneezing. It can also spread when you are close to someone who is infected. And it can spread when you touch something that has the virus on it, such as a doorknob or a tabletop. What can you do to protect yourself from coronavirus (COVID-19)? The best way to protect yourself from getting sick is to:  · Avoid areas where there is an outbreak. · Avoid contact with people who may be infected. · Wash your hands often with soap or alcohol-based hand sanitizers. · Avoid crowds and try to stay at least 6 feet away from other people. · Wash your hands often, especially after you cough or sneeze. Use soap and water, and scrub for at least 20 seconds. If soap and water aren't available, use an alcohol-based hand . · Avoid touching your mouth, nose, and eyes. What can you do to avoid spreading the virus to others? To help avoid spreading the virus to others:  · Cover your mouth with a tissue when you cough or sneeze. Then throw the tissue in the trash. · Use a disinfectant to clean things that you touch often. · Stay home if you are sick or have been exposed to the virus. Don't go to school, work, or public areas. And don't use public transportation.   · If you are sick:  ? Leave your home only if you need to get medical care. But call the doctor's office first so they know you're coming. And wear a face mask, if you have one.  ? If you have a face mask, wear it whenever you're around other people. It can help stop the spread of the virus when you cough or sneeze. ? Clean and disinfect your home every day. Use household  and disinfectant wipes or sprays. Take special care to clean things that you grab with your hands. These include doorknobs, remote controls, phones, and handles on your refrigerator and microwave. And don't forget countertops, tabletops, bathrooms, and computer keyboards. When to call for help  Call 911 anytime you think you may need emergency care. For example, call if:  · You have severe trouble breathing. (You can't talk at all.)  · You have constant chest pain or pressure. · You are severely dizzy or lightheaded. · You are confused or can't think clearly. · Your face and lips have a blue color. · You pass out (lose consciousness) or are very hard to wake up. Call your doctor now if you develop symptoms such as:  · Shortness of breath. · Fever. · Cough. If you need to get care, call ahead to the doctor's office for instructions before you go. Make sure you wear a face mask, if you have one, to prevent exposing other people to the virus. Where can you get the latest information? The following health organizations are tracking and studying this virus. Their websites contain the most up-to-date information. Trent Workman also learn what to do if you think you may have been exposed to the virus. · U.S. Centers for Disease Control and Prevention (CDC): The CDC provides updated news about the disease and travel advice. The website also tells you how to prevent the spread of infection. www.cdc.gov  · World Health Organization [facility]): WHO offers information about the virus outbreaks.  WHO also has travel advice. www.who.int  Current as of: April 1, 2020               Content Version: 12.4  © 3070-7854 [employer]. Care instructions adapted under license by your healthcare professional. If you have questions about a medical condition or this instruction, always ask your healthcare professional. [address] any warranty or liability for your use of this information.

## 2021-10-29 ENCOUNTER — HOSPITAL ENCOUNTER (EMERGENCY)
Age: 76
Discharge: HOME | End: 2021-10-29
Attending: EMERGENCY MEDICINE
Payer: MEDICARE

## 2021-10-29 VITALS
WEIGHT: 185 LBS | OXYGEN SATURATION: 97 % | RESPIRATION RATE: 18 BRPM | DIASTOLIC BLOOD PRESSURE: 81 MMHG | TEMPERATURE: 98.4 F | HEART RATE: 56 BPM | SYSTOLIC BLOOD PRESSURE: 162 MMHG | BODY MASS INDEX: 26.48 KG/M2 | HEIGHT: 70 IN

## 2021-10-29 DIAGNOSIS — I49.1 PAC (PREMATURE ATRIAL CONTRACTION): Primary | ICD-10-CM

## 2021-10-29 DIAGNOSIS — R00.2 PALPITATIONS: ICD-10-CM

## 2021-10-29 DIAGNOSIS — R10.9 CHRONIC ABDOMINAL PAIN: ICD-10-CM

## 2021-10-29 DIAGNOSIS — G89.29 CHRONIC ABDOMINAL PAIN: ICD-10-CM

## 2021-10-29 LAB
ALBUMIN SERPL-MCNC: 3.7 G/DL (ref 3.5–5)
ALBUMIN/GLOB SERPL: 1.2 {RATIO} (ref 1.1–2.2)
ALP SERPL-CCNC: 61 U/L (ref 45–117)
ALT SERPL-CCNC: 24 U/L (ref 12–78)
ANION GAP SERPL CALC-SCNC: 4 MMOL/L (ref 5–15)
APPEARANCE UR: CLEAR
AST SERPL-CCNC: 11 U/L (ref 15–37)
BACTERIA URNS QL MICRO: NEGATIVE /HPF
BASOPHILS # BLD: 0 K/UL (ref 0–0.1)
BASOPHILS NFR BLD: 1 % (ref 0–1)
BILIRUB SERPL-MCNC: 0.4 MG/DL (ref 0.2–1)
BILIRUB UR QL: NEGATIVE
BUN SERPL-MCNC: 18 MG/DL (ref 6–20)
BUN/CREAT SERPL: 17 (ref 12–20)
CALCIUM SERPL-MCNC: 9.3 MG/DL (ref 8.5–10.1)
CHLORIDE SERPL-SCNC: 110 MMOL/L (ref 97–108)
CO2 SERPL-SCNC: 24 MMOL/L (ref 21–32)
COLOR UR: NORMAL
COMMENT, HOLDF: NORMAL
CREAT SERPL-MCNC: 1.03 MG/DL (ref 0.7–1.3)
DIFFERENTIAL METHOD BLD: NORMAL
EOSINOPHIL # BLD: 0.2 K/UL (ref 0–0.4)
EOSINOPHIL NFR BLD: 4 % (ref 0–7)
EPITH CASTS URNS QL MICRO: NORMAL /LPF
ERYTHROCYTE [DISTWIDTH] IN BLOOD BY AUTOMATED COUNT: 14 % (ref 11.5–14.5)
GLOBULIN SER CALC-MCNC: 3.1 G/DL (ref 2–4)
GLUCOSE SERPL-MCNC: 113 MG/DL (ref 65–100)
GLUCOSE UR STRIP.AUTO-MCNC: NEGATIVE MG/DL
HCT VFR BLD AUTO: 44.6 % (ref 36.6–50.3)
HGB BLD-MCNC: 14.9 G/DL (ref 12.1–17)
HGB UR QL STRIP: NEGATIVE
HYALINE CASTS URNS QL MICRO: NORMAL /LPF (ref 0–5)
IMM GRANULOCYTES # BLD AUTO: 0 K/UL (ref 0–0.04)
IMM GRANULOCYTES NFR BLD AUTO: 0 % (ref 0–0.5)
KETONES UR QL STRIP.AUTO: NEGATIVE MG/DL
LACTATE SERPL-SCNC: 1 MMOL/L (ref 0.4–2)
LEUKOCYTE ESTERASE UR QL STRIP.AUTO: NEGATIVE
LIPASE SERPL-CCNC: 48 U/L (ref 73–393)
LYMPHOCYTES # BLD: 1.1 K/UL (ref 0.8–3.5)
LYMPHOCYTES NFR BLD: 19 % (ref 12–49)
MAGNESIUM SERPL-MCNC: 2.4 MG/DL (ref 1.6–2.4)
MCH RBC QN AUTO: 30.8 PG (ref 26–34)
MCHC RBC AUTO-ENTMCNC: 33.4 G/DL (ref 30–36.5)
MCV RBC AUTO: 92.3 FL (ref 80–99)
MONOCYTES # BLD: 0.4 K/UL (ref 0–1)
MONOCYTES NFR BLD: 7 % (ref 5–13)
NEUTS SEG # BLD: 3.8 K/UL (ref 1.8–8)
NEUTS SEG NFR BLD: 69 % (ref 32–75)
NITRITE UR QL STRIP.AUTO: NEGATIVE
NRBC # BLD: 0 K/UL (ref 0–0.01)
NRBC BLD-RTO: 0 PER 100 WBC
PH UR STRIP: 5 [PH] (ref 5–8)
PLATELET # BLD AUTO: 199 K/UL (ref 150–400)
PMV BLD AUTO: 11.4 FL (ref 8.9–12.9)
POTASSIUM SERPL-SCNC: 3.7 MMOL/L (ref 3.5–5.1)
PROT SERPL-MCNC: 6.8 G/DL (ref 6.4–8.2)
PROT UR STRIP-MCNC: NEGATIVE MG/DL
RBC # BLD AUTO: 4.83 M/UL (ref 4.1–5.7)
RBC #/AREA URNS HPF: NORMAL /HPF (ref 0–5)
SAMPLES BEING HELD,HOLD: NORMAL
SODIUM SERPL-SCNC: 138 MMOL/L (ref 136–145)
SP GR UR REFRACTOMETRY: 1.01 (ref 1–1.03)
TROPONIN-HIGH SENSITIVITY: 10 NG/L (ref 0–76)
TROPONIN-HIGH SENSITIVITY: 11 NG/L (ref 0–76)
UR CULT HOLD, URHOLD: NORMAL
UROBILINOGEN UR QL STRIP.AUTO: 0.2 EU/DL (ref 0.2–1)
WBC # BLD AUTO: 5.6 K/UL (ref 4.1–11.1)
WBC URNS QL MICRO: NORMAL /HPF (ref 0–4)

## 2021-10-29 PROCEDURE — 36415 COLL VENOUS BLD VENIPUNCTURE: CPT

## 2021-10-29 PROCEDURE — 99284 EMERGENCY DEPT VISIT MOD MDM: CPT

## 2021-10-29 PROCEDURE — 80053 COMPREHEN METABOLIC PANEL: CPT

## 2021-10-29 PROCEDURE — 83690 ASSAY OF LIPASE: CPT

## 2021-10-29 PROCEDURE — 83735 ASSAY OF MAGNESIUM: CPT

## 2021-10-29 PROCEDURE — 93005 ELECTROCARDIOGRAM TRACING: CPT

## 2021-10-29 PROCEDURE — 85025 COMPLETE CBC W/AUTO DIFF WBC: CPT

## 2021-10-29 PROCEDURE — 83605 ASSAY OF LACTIC ACID: CPT

## 2021-10-29 PROCEDURE — 81001 URINALYSIS AUTO W/SCOPE: CPT

## 2021-10-29 PROCEDURE — 84484 ASSAY OF TROPONIN QUANT: CPT

## 2021-10-29 NOTE — ED TRIAGE NOTES
Pt ambulatory to ED with c/o SOB, fatigue, diarrhea, sore throat and fever onset after EGD done on 10/27/21. Pt reports \"Whenever they use anesthesia on me, I feel this way afterwards\".

## 2021-10-30 NOTE — ED NOTES
Pt discharged with paperwork. Pt had no questions about discharge plan and was alert & oriented in no distress. Pt indicated he would call his PCP and GI doctor ASAP and set up follow up appointments.

## 2021-10-30 NOTE — ED PROVIDER NOTES
Patient is a 78-year-old male with past medical history significant for GERD, chronic fatigue syndrome and adverse effects of anesthesia stating that is taken him months to get back to normal after receiving anesthesia. He states that he underwent a procedure by Dr. Danny Palma on the 27th of this month to evaluate for chronic postprandial pain that he was having. He is status post cholecystectomy. He states that for a great deal of time he has significant abdominal pain anytime he eats any solid food. As a result he is seen multiple gastroenterologist.  He did eventually see a hepatologist who advised that he see Dr. Danny Palma. Per report imaging did show evidence of possible chronic pancreatitis. Patient states that he is supposed to follow-up with Dr. Danny Palma for further testing. He states that when he went home he slept most of the day and has felt very fatigued. He did state he felt a little bit short of breath at first but denies feeling that way now. Patient also states that he feels as though his heart rate has been irregular. Denies any past history of A. fib to his knowledge. He does state that after receiving anesthesia in the past that he had problems with significantly low heart rate and discussed possible pacemaker with a cardiologist however elected to wait and see if things resolved on their own. He states that after couple months things did seem to get back to normal.  He states he is having some abdominal pain but denies that it is significantly worse than his baseline.            Past Medical History:   Diagnosis Date    Adverse effect of anesthesia     reaction to anesthesia/took two months to come out of it/\"almost killed me\"    Anemia     CFS (chronic fatigue syndrome)     GERD (gastroesophageal reflux disease)     Rotator cuff disorder     right       Past Surgical History:   Procedure Laterality Date    HX APPENDECTOMY      HX COLONOSCOPY      HX HEENT  07/2014    SINUS SURGERY- \"CLEANING OUT\" INFECTION OF SINUSES    HX HERNIA REPAIR Right 1986    INGUINAL    HX HERNIA REPAIR  01/2017    Laparoscopic paraesophageal hernia repair with mesh     HX LAP CHOLECYSTECTOMY  01/2017    HX ORTHOPAEDIC Right 1988    SHOULDER REPAIR ( PT DOESN'T KNOW)     HX ORTHOPAEDIC  1964     BONE SPURS/NODULE REMOVED NEAR LEFT FEMUR    HX RETINAL DETACHMENT REPAIR Left 2004    HX ROTATOR CUFF REPAIR Right 06/2017    HX TONSILLECTOMY           Family History:   Problem Relation Age of Onset    Hypertension Mother     Stroke Mother     Diabetes Mother     Heart Disease Mother     Cancer Father         ABDOMINAL CANCER    Anesth Problems Neg Hx        Social History     Socioeconomic History    Marital status: SINGLE     Spouse name: Not on file    Number of children: Not on file    Years of education: Not on file    Highest education level: Not on file   Occupational History    Not on file   Tobacco Use    Smoking status: Never Smoker    Smokeless tobacco: Never Used   Substance and Sexual Activity    Alcohol use: Yes     Comment: RARELY    Drug use: No    Sexual activity: Not on file   Other Topics Concern    Not on file   Social History Narrative    Not on file     Social Determinants of Health     Financial Resource Strain:     Difficulty of Paying Living Expenses:    Food Insecurity:     Worried About Running Out of Food in the Last Year:     [address] in the Last Year:    Transportation Needs:     Lack of Transportation (Medical):      Lack of Transportation (Non-Medical):    Physical Activity:     Days of Exercise per Week:     Minutes of Exercise per Session:    Stress:     Feeling of Stress :    Social Connections:     Frequency of Communication with Friends and Family:     Frequency of Social Gatherings with Friends and Family:     Attends Congregation Services:     Active Member of Clubs or Organizations:     Attends Club or Organization Meetings:     Marital Status:    Intimate Partner Violence:     Fear of Current or Ex-Partner:     Emotionally Abused:     Physically Abused:     Sexually Abused: ALLERGIES: Latex; Sulfa (sulfonamide antibiotics); Broccoli; Chlorhexidine; Diltiazem; Gluten; Ibuprofen; Lactose; Morphine; Other plant, animal, environmental; Percocet [oxycodone-acetaminophen]; Propafenone; Tapioca; Tramadol; and Wheat    Review of Systems   Constitutional: Positive for fatigue. HENT: Negative for drooling. Respiratory: Positive for shortness of breath (Minimal yesterday and since resolved. ). Negative for choking. Cardiovascular: Positive for palpitations. Negative for leg swelling. Gastrointestinal: Positive for abdominal pain. Negative for vomiting. Genitourinary: Positive for dysuria. Musculoskeletal: Negative for neck pain and neck stiffness. Skin: Negative for rash and wound. Neurological: Negative for seizures, syncope and facial asymmetry. Hematological: Does not bruise/bleed easily. Psychiatric/Behavioral: Negative. Vitals:    10/29/21 1856   BP: (!) 168/84   Pulse: 62   Resp: 18   Temp: 99 °F (37.2 °C)   SpO2: 98%   Weight: 83.9 kg (185 lb)   Height: 5' 10\" (1.778 m)            Physical Exam  Vitals and nursing note reviewed. Constitutional:       Appearance: He is not toxic-appearing or diaphoretic. HENT:      Head: Normocephalic and atraumatic. Neck:      Trachea: No tracheal deviation. Cardiovascular:      Rate and Rhythm: Normal rate. Extrasystoles are present. Heart sounds: No friction rub. Pulmonary:      Effort: Pulmonary effort is normal.      Breath sounds: Normal breath sounds. Chest:      Chest wall: No deformity or crepitus. Abdominal:      Palpations: Abdomen is soft. Tenderness: There is abdominal tenderness. There is no guarding or rebound. Musculoskeletal:      Cervical back: Neck supple. Right lower leg: No tenderness. No edema.       Left lower leg: No tenderness. No edema. Skin:     General: Skin is warm and dry. Neurological:      Mental Status: He is alert and oriented to person, place, and time. Psychiatric:         Mood and Affect: Mood normal.         Behavior: Behavior normal.          MDM  Number of Diagnoses or Management Options  PAC (premature atrial contraction)  Palpitations  Diagnosis management comments: Patient offered CTA chest and CT abdomen to further evaluate symptoms described however declines at this time.        Amount and/or Complexity of Data Reviewed  Clinical lab tests: reviewed and ordered  Independent visualization of images, tracings, or specimens: yes    Patient Progress  Patient progress: improved         Procedures

## 2021-10-30 NOTE — DISCHARGE INSTRUCTIONS
Please follow closely with your doctors and return to the emergency department for any new or worsening symptoms.

## 2021-10-31 LAB
ATRIAL RATE: 58 BPM
CALCULATED R AXIS, ECG10: -3 DEGREES
CALCULATED T AXIS, ECG11: 51 DEGREES
DIAGNOSIS, 93000: NORMAL
Q-T INTERVAL, ECG07: 434 MS
QRS DURATION, ECG06: 94 MS
QTC CALCULATION (BEZET), ECG08: 471 MS
VENTRICULAR RATE, ECG03: 71 BPM

## 2021-11-08 ENCOUNTER — TELEPHONE (OUTPATIENT)
Dept: HEMATOLOGY | Age: 76
End: 2021-11-08

## 2021-11-08 NOTE — TELEPHONE ENCOUNTER
[email or web address] Spoke w/patient concerning is problems from below. Since EUS on 10/27/21 he has been having nausea, chest pain and just feeling good. Patient went to ER on 10/29/21 related to chest discomfort--patient has irregular heart rate now per ER. Patient states \"I have called multiple time to Dr. Anurag Mcneal office with no call back for the past two weeks. I think during the EUS something went wrong and now nobody wants to talk with me on the phone per patient. \" Suggest patient follow up with his PCP for a cardiac work up possibly if he is still having chest discomfort. Patient request if I could call over to Dr. Anurag Mcneal office for him to found out information on his procedure. I called left message only for [address] [facility] to return patient call when she get a chance. (KF)

## 2021-11-08 NOTE — TELEPHONE ENCOUNTER
Patient had EGD done cant get in touch with doctor that performed procedure and wants MLS to help find out reults because he says he didn't do well would like a call back from a nurse to suggest what to do

## 2021-11-09 ENCOUNTER — TRANSCRIBE ORDER (OUTPATIENT)
Dept: SCHEDULING | Age: 76
End: 2021-11-09

## 2021-11-09 DIAGNOSIS — R94.8 ABNORMAL PANCREAS FUNCTION TEST: ICD-10-CM

## 2021-11-09 DIAGNOSIS — R10.9 ABDOMINAL PAIN: Primary | ICD-10-CM

## 2021-11-12 ENCOUNTER — HOSPITAL ENCOUNTER (OUTPATIENT)
Dept: CT IMAGING | Age: 76
Discharge: HOME | End: 2021-11-12
Attending: NURSE PRACTITIONER
Payer: MEDICARE

## 2021-11-12 DIAGNOSIS — R94.8 ABNORMAL PANCREAS FUNCTION TEST: ICD-10-CM

## 2021-11-12 DIAGNOSIS — R10.9 ABDOMINAL PAIN: ICD-10-CM

## 2021-11-12 PROCEDURE — 74011000636 HC RX REV CODE- 636: Performed by: RADIOLOGY

## 2021-11-12 PROCEDURE — 74160 CT ABDOMEN W/CONTRAST: CPT

## 2021-11-12 RX ADMIN — IOPAMIDOL 100 ML: 755 INJECTION, SOLUTION INTRAVENOUS at 17:00

## 2021-11-12 NOTE — PROGRESS NOTES
Anesthesiology    I returned a call to the patient secondary to concerns of \"receiving too much medicine during my procedure. \"    Chart reviewed and case discussed in depth with patient regarding his anesthetic on 10/27/201. All questions discussed and answered.

## 2021-11-24 ENCOUNTER — TELEPHONE (OUTPATIENT)
Dept: HEMATOLOGY | Age: 76
End: 2021-11-24

## 2021-11-24 NOTE — TELEPHONE ENCOUNTER
[email or web address] Patient called back w/Cardiologist name Dr. Ponce Pozo H-F (p) [phone] (R) [phone] his nurse name Lui Ruiz. Sent CT scan report over per patient request.(KF)

## 2021-11-24 NOTE — TELEPHONE ENCOUNTER
[email or web address] Patient arrived in our office asking to speak with me. Patient has multiple complaints bout Dr. Melody Love office and how Margarito Connolly will not go over his results from his recent procedure of EUA. Every time he calls the office they tell him the nurse will call back--which she has a few time but no doctor have explained the next step or what showed on his procedure. Patient now has to go to cardiologist on Monday 11/29/21--name unknown he will call me back on Monday with name of cardiologist. Patient would like  to be aware of the problem he has with not getting information. Still having chest pain since procedure thinks anesthesia give him too much medication at the time of procedure. Call over to Dr. Melody Love office to make aware he is wanting only MD to return call. Next appt in our office 1/24/21. (KF)

## 2021-11-30 NOTE — TELEPHONE ENCOUNTER
[email or web address] Patient showed up in office to complain again about Dr. Italo Connor and staff. Complain nobody talks to him when he calls the office leaves him on hold for 30 mins to 1 hour. Creon dose 36,000 gives him diarrhea so he has stopped taking it and wants a script for 3,000 try to explain to patient that he needs to call [employer] office for appointment but then patient states \"everytime I call that office nobody calls me back. \"  Patient states \"I am going to file a complaint with Medicare and the state board office. Angel w/[employer] will call patient today to set up appointment and send message to [employer] [facility].  (KF)

## 2021-12-15 ENCOUNTER — TELEPHONE (OUTPATIENT)
Dept: HEMATOLOGY | Age: 76
End: 2021-12-15

## 2021-12-15 NOTE — TELEPHONE ENCOUNTER
[email or web address] Spoke w/patient. Complain that after eating breakfast and taking medication he becomes tired/foggy in have to lay down for about 4 hours. This has been going on for about 4 years nobody can figure it out. I will discuss W/ on 12/16/21 in office. (KF)

## 2022-01-24 ENCOUNTER — OFFICE VISIT (OUTPATIENT)
Dept: HEMATOLOGY | Age: 77
End: 2022-01-24
Payer: MEDICARE

## 2022-01-24 VITALS
WEIGHT: 185 LBS | OXYGEN SATURATION: 97 % | HEIGHT: 70 IN | BODY MASS INDEX: 26.48 KG/M2 | HEART RATE: 62 BPM | SYSTOLIC BLOOD PRESSURE: 130 MMHG | RESPIRATION RATE: 17 BRPM | DIASTOLIC BLOOD PRESSURE: 87 MMHG | TEMPERATURE: 96.6 F

## 2022-01-24 DIAGNOSIS — R10.32 LEFT LOWER QUADRANT ABDOMINAL PAIN: ICD-10-CM

## 2022-01-24 DIAGNOSIS — K76.0 FATTY LIVER: Primary | ICD-10-CM

## 2022-01-24 DIAGNOSIS — K86.1 OTHER CHRONIC PANCREATITIS (HCC): ICD-10-CM

## 2022-01-24 PROCEDURE — 99214 OFFICE O/P EST MOD 30 MIN: CPT | Performed by: NURSE PRACTITIONER

## 2022-01-24 PROCEDURE — G8419 CALC BMI OUT NRM PARAM NOF/U: HCPCS | Performed by: NURSE PRACTITIONER

## 2022-01-24 PROCEDURE — G0463 HOSPITAL OUTPT CLINIC VISIT: HCPCS | Performed by: NURSE PRACTITIONER

## 2022-01-24 PROCEDURE — 91200 LIVER ELASTOGRAPHY: CPT | Performed by: NURSE PRACTITIONER

## 2022-01-24 PROCEDURE — G8536 NO DOC ELDER MAL SCRN: HCPCS | Performed by: NURSE PRACTITIONER

## 2022-01-24 PROCEDURE — G8432 DEP SCR NOT DOC, RNG: HCPCS | Performed by: NURSE PRACTITIONER

## 2022-01-24 PROCEDURE — 3017F COLORECTAL CA SCREEN DOC REV: CPT | Performed by: NURSE PRACTITIONER

## 2022-01-24 PROCEDURE — 1101F PT FALLS ASSESS-DOCD LE1/YR: CPT | Performed by: NURSE PRACTITIONER

## 2022-01-24 PROCEDURE — G8427 DOCREV CUR MEDS BY ELIG CLIN: HCPCS | Performed by: NURSE PRACTITIONER

## 2022-01-24 RX ORDER — PANCRELIPASE 15000; 3000; 9500 [USP'U]/1; [USP'U]/1; [USP'U]/1
CAPSULE, DELAYED RELEASE ORAL
COMMUNITY
Start: 2021-12-01 | End: 2022-04-18

## 2022-01-24 NOTE — PROGRESS NOTES
[facility]      Kristen Angulo MD, Megan Emerson, Tigre Hercules MD, MPH      Rafaela Colby, PA-SANDRA Last, ACNP-BC     Glenny Bailey, [facility]   Oz Graham, FNP-C    Jesus Newman, [facility]       Glenis Larson [address]    at [address], [address] [place] [address], [facility] 22.    [phone]    FAX: [address] [place] [address], [address]    [phone]    FAX: [phone]       Patient Care Team:  Robin Young MD as PCP - General (Internal Medicine)  Robin Young MD as PCP - [address] [place] [address] Provider  Loli Hayes MD as Physician (Cardiology)  Dina Rubin MD as Physician (Cardiology)  Keshav Pimentel MD as Surgeon (General Surgery)  Myrtle Yee MD as Surgeon (General Surgery)      Problem List  Date Reviewed: 1/24/2022          Codes Class Noted    Left lower quadrant abdominal pain ICD-10-CM: R10.32  ICD-9-CM: 789.04  1/24/2022        Other chronic pancreatitis ([facility] ICD-10-CM: K86.1  ICD-9-CM: 577.1  1/24/2022        Fatty liver ICD-10-CM: K76.0  ICD-9-CM: 571.8  8/22/2021        Dilated cbd, acquired ICD-10-CM: K83.8  ICD-9-CM: 576.8  6/73/6271        H/O umbilical hernia repair MARTI-46-CF: Z98.890, Z87.19  ICD-9-CM: V15.29  8/22/2021        History of Nissen fundoplication [facility]-03-NF: G40.950  ICD-9-CM: V15.29  7/11/2021        H/O shoulder surgery ICD-10-CM: Z98.890  ICD-9-CM: V45.89  3/14/2020        Tear of medial meniscus of left knee, current ICD-10-CM: I39.462H  ICD-9-CM: 836.0  2/26/2020        SSS (sick sinus syndrome) ([facility] 75.) ICD-10-CM: I49.5  ICD-9-CM: 427.81  7/30/2015        Abnormal EKG ICD-10-CM: R94.31  ICD-9-CM: 794.31  6/24/2015        GERD (gastroesophageal reflux disease) ICD-10-CM: K21.9  ICD-9-CM: 530.81  1/31/2015              Anthony Banuelos is being seen at [facility] for management of non-specific abdominal pain. The active problem list, all pertinent past medical history, medications, endoscopic studies,   radiologic findings and laboratory findings related to the liver disorder were reviewed and discussed with the patient. The patient is a 77 y.o.  male who develoepd RUQ in 11/2020. Serologic evaluation for markers of chronic liver disease was negative. The most recent imaging of the liver was MRI performed in 7/2021. Results demonstrate mild dilation of the CBD and PD to the ampula without obvious mass or stones. Hepatic steatosis and liver cysts. .      Assessment of liver fibrosis with Fibroscan 8/03/2021. 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 The CAP score of 305 suggests hepatic steatosis. He returns for follow-up Fibroscan today. Since the last office visit the patient underwent an EUS with Dr. Elvira Quiñones 10/27/2021. No biliary stricture or stones found. The pancreatic parenchyma was atrophied and PD dilated at 7 mm indicating chronic pancreatitis. Normal appearing liver. The patient had post-procedural nausea and chest pain that has been persistent. The patient has the following symptoms which could be attributed to the liver disorder: Fatigue. Of note, he has ongoing pain in the left lower quadrant where he had a previous hernia repair with mesh. The patient is not experiencing the following symptoms which are commonly seen in this liver disorder: Fevers, chills, nausea, or vomiting. The patient completes all daily activities without any functional limitations.       ASSESSMENT AND PLAN:  Fatty liver  Suspect the patient has fatty liver based upon imaging, Fiboscan CAP score, serologic studies that are negative for other causes of chronic liver disease,     A liver biopsy has not been performed. Fibroscan in 8/2021 demonstrated  10.0 kPa and  suggesting fatty liver and Stage 3 bridging fibrosis    Assessment of liver fibrosis was performed with Fibroscan in the office today. The result was 3.3 kPa which correlates with no fibrosis. Liver transaminases are normal.  ALP is normal.  Liver function is normal.  The platelet count is   normal.      Based upon laboratory studies Fibroscan, and imaging the patient may have advanced liver disease. The patient does not seem to have underlying liver disease based on imaging and the most recent Fibroscan. He wishes to meet with Dr. Janie Kilgore to discuss his ongoing GI issues. Dilated CBD and PD  MRI and MRCP demonstrate mild dilation of the CBD and PD.  EUS performed 10/2021 CBD normal with no stricture, stone, or sludge. There is no mass or stones. The PD is dilated with atrophied parenchyma suggesting chronic pancreatitis. He is now being treated with CREON. Screening for Hepatocellular Carcinoma  HCC screening is not necessary if the patient has no evidence of cirrhosis. Treatment of other medical problems in patients with chronic liver disease  There are no contraindications for the patient to take most medications that are necessary for treatment of other medical issues. Counseling for alcohol in patients with chronic liver disease  The patient does not consume any significant amount of alcohol. Vaccinations   Routine vaccinations against other bacterial and viral agents can be performed as indicated. Annual flu vaccination should be administered if indicated. ALLERGIES  Allergies   Allergen Reactions    Latex Itching     AND HIVES    Sulfa (Sulfonamide Antibiotics) Anaphylaxis    Acetaminophen Other (comments)    Broccoli Nausea and Vomiting    Chlorhexidine Rash    Diltiazem Nausea Only    Gluten Other (comments)     \" WANTS TO SLEEP 24 HOURS A DAY , AND IT AFFECTS MY HEART\", PT CAN'T ELABORATE.      Ibuprofen Nausea and Vomiting    Lactose Other (comments) and Hives     EXTREME INDIGESTION  EXTREME INDIGESTION    Lisinopril Other (comments)     Other reaction(s): face swelling   Other reaction(s): face swelling     Morphine Itching     Patient stated    Other Plant, Animal, Environmental Nausea and Vomiting     Pink food coloring    Percocet [Oxycodone-Acetaminophen] Itching    Propafenone Other (comments) and Nausea and Vomiting     Nausea and pain    Nausea and pain    Tapioca Nausea and Vomiting    Tramadol Nausea and Vomiting    Wheat Other (comments)     \" WANTS TO SLEEP 24 HOURS A DAY\"       MEDICATIONS  Current Outpatient Medications   Medication Sig    Creon 3,000-9,500- 15,000 unit cpDR capsule     testosterone (ANDROGEL) 1 % (25 mg/2.5gram) glpk 25 mg by TransDERmal route daily.  diphenhydramine HCl (ALLERGY PO) Take  by mouth as needed. No current facility-administered medications for this visit. SYSTEM REVIEW NOT RELATED TO LIVER DISEASE OR REVIEWED ABOVE:  Constitution systems: Negative for fever, chills, weight gain, weight loss. Eyes: Negative for visual changes. ENT: Negative for sore throat, painful swallowing. Respiratory: Negative for cough, hemoptysis, SOB. Cardiology: Negative for chest pain, palpitations. GI:  Negative for constipation or diarrhea. : Negative for urinary frequency, dysuria, hematuria, nocturia. Skin: Negative for rash. Hematology: Negative for easy bruising, blood clots. Musculo-skeletal: Negative for back pain, muscle pain, weakness. Neurologic: Negative for headaches, dizziness, vertigo, memory problems not related to HE. Psychology: Negative for anxiety, depression. FAMILY HISTORY:  The father  of colon cancer. The mother  at age [address]. There is no family history of liver disease. SOCIAL HISTORY:  The patient is . The patient has 1 child. The patient has never used tobacco products.     The patient has never consumed significant amounts of alcohol. The patient used to work as  for [employer] . PHYSICAL EXAMINATION:  Visit Vitals  /87 (BP 1 Location: Left upper arm, BP Patient Position: Sitting, BP Cuff Size: Adult)   Pulse 62   Temp (!) 96.6 °F (35.9 °C) (Temporal)   Resp 17   Ht 5' 10\" (1.778 m)   Wt 185 lb (83.9 kg)   SpO2 97%   BMI 26.54 kg/m²       General: No acute distress. Eyes: Sclera anicteric. ENT: No oral lesions. Thyroid normal.  Nodes: No adenopathy. Skin: No spider angiomata. No jaundice. No palmar erythema. Respiratory: Lungs clear to auscultation. Cardiovascular: Regular heart rate. No murmurs. No JVD. Abdomen: Soft non-tender, liver size normal to percussion/palpation. Spleen not palpable. No obvious ascites. Extremities: No edema. No muscle wasting. No gross arthritic changes. Neurologic: Alert and oriented. Cranial nerves grossly intact. No asterixis.     LABORATORY STUDIES:  Liver [place] of [address] [place] [address] & Units 10/29/2021 7/1/2021   WBC 4.1 - 11.1 K/uL 5.6 6.3   ANC 1.8 - 8.0 K/UL 3.8 5.6   HGB 12.1 - 17.0 g/dL 14.9 14.8    - 400 K/uL 199 188   INR 0.9 - 1.2  1.0   AST 15 - 37 U/L 11 (L) 11   ALT 12 - 78 U/L 24 12   Alk Phos 45 - 117 U/L 61 65   Bili, Total 0.2 - 1.0 MG/DL 0.4 0.5   Bili, Direct 0.00 - 0.40 mg/dL  0.13   Albumin 3.5 - 5.0 g/dL 3.7 4.6   BUN 6 - 20 MG/DL 18 16   Creat 0.70 - 1.30 MG/DL 1.03 0.93   Creat (iSTAT) 0.6 - 1.3 mg/dL     Na 136 - 145 mmol/L 138 142   K 3.5 - 5.1 mmol/L 3.7 4.5   Cl 97 - 108 mmol/L 110 (H) 106   CO2 21 - 32 mmol/L 24 24   Glucose 65 - 100 mg/dL 113 (H) 137 (H)   Magnesium 1.6 - 2.4 mg/dL 2.4      SEROLOGIES:  Serologies Latest Ref Rng & Units 7/1/2021   Hep B Surface Ag Negative Negative   Hep B Core Ab, Total Negative Negative   Hep B Surface AB QL  Non Reactive   Hep C Ab 0.0 - 0.9 s/co ratio <0.1   Ferritin 30 - 400 ng/mL 52   Alpha-1 antitrypsin level 101 - 187 mg/dL 115 LIVER HISTOLOGY:  8/2021. FibroScan performed at [address]. EkPa was 10.0. IQR/med 18%. . The results suggested a fibrosis level of F3. The CAP score suggests there is hepatic steatosis. 1/2022. FibroScan performed at [address]. EkPa was 3.3. IQR/med 36%. . The results suggested a fibrosis level of F0. The CAP score suggests there is no significant hepatic steatosis. ENDOSCOPIC PROCEDURES:  10/2021. EUS. No CBD stricture, stones or sludge. Pancreas parenchyma atrophied with dilated PD suggesting chronic pancreatitis. RADIOLOGY:  11/2020. CT scan abdomen with IV contrast.  NOrmal appearing liver. Severllow density lesions in liver. The largest is 2.2 x 1.4 cm. Most likely cysts. 7/2021. MRI and MRCP. Changes consistent with fatty liver. No liver mass lesions. Mild dilation of CBD and PD. Normal spleen. No ascites. OTHER TESTING:  Not available or performed    FOLLOW-UP:  All of the issues listed above in the Assessment and Plan were discussed with the patient. All questions were answered. The patient expressed a clear understanding of the above. [facility] in 4 weeks with Dr. Valeriano Sneed to further discuss GI issues per patient request.       ROSEY Maxwell-BC  [address] of [address] [id number] Darius Molina, [facility] 22.  [facility]

## 2022-01-24 NOTE — PROGRESS NOTES
Identified pt with two pt identifiers(name and ). Reviewed record in preparation for visit and have obtained necessary documentation. Chief Complaint   Patient presents with    Fatty Liver     Fibroscan f/u   Patient states he is also having a level 10 chest and left side abdominal pain. He also gets very nauseated after eating and has to   Vitals:    22 1015   BP: 130/87   Pulse: 62   Resp: 17   Temp: (!) 96.6 °F (35.9 °C)   TempSrc: Temporal   SpO2: 97%   Weight: 185 lb (83.9 kg)   Height: 5' 10\" (1.778 m)   PainSc:  10 - Worst pain ever   PainLoc: Abdomen     Health Maintenance Review: Patient reminded of \"due or due soon\" health maintenance. I have asked the patient to contact his/her primary care provider (PCP) for follow-up on his/her health maintenance. Coordination of Care Questionnaire:  :   1) Have you been to an emergency room, urgent care, or hospitalized since your last visit? If yes, where when, and reason for visit? no       2. Have seen or consulted any other health care provider since your last visit? If yes, where when, and reason for visit? Yes, Cardiologist, [facility]  f/u for chest pain      Patient is accompanied by self I have received verbal consent from Jeffery Banuelos to discuss any/all medical information while they are present in the room.

## 2022-01-25 ENCOUNTER — DOCUMENTATION ONLY (OUTPATIENT)
Dept: HEMATOLOGY | Age: 77
End: 2022-01-25

## 2022-01-25 ENCOUNTER — TELEPHONE (OUTPATIENT)
Dept: HEMATOLOGY | Age: 77
End: 2022-01-25

## 2022-01-25 NOTE — TELEPHONE ENCOUNTER
Patient called requesting an Rx for Carafate liquid. His pharmacist suggested it. He said you got busy discussing other things at his appt yesterday and he forgot to ask about it in person. Patient can be reached at [phone] if you want to discuss with him.

## 2022-01-26 NOTE — TELEPHONE ENCOUNTER
[email or web address] Spoke w/patient concerning message below. At this time April, NP is unable to order medication requested. Recommend pancreatitic clinic @ [facility]. Per patient, he had a bad experience with a MD at [facility]. Advise patient to refuse to PCP. (KF)

## 2022-03-17 ENCOUNTER — OFFICE VISIT (OUTPATIENT)
Dept: HEMATOLOGY | Age: 77
End: 2022-03-17
Payer: MEDICARE

## 2022-03-17 VITALS
RESPIRATION RATE: 16 BRPM | SYSTOLIC BLOOD PRESSURE: 119 MMHG | OXYGEN SATURATION: 97 % | BODY MASS INDEX: 25.62 KG/M2 | HEART RATE: 62 BPM | DIASTOLIC BLOOD PRESSURE: 76 MMHG | WEIGHT: 179 LBS | TEMPERATURE: 96.6 F | HEIGHT: 70 IN

## 2022-03-17 DIAGNOSIS — K76.0 FATTY LIVER: Primary | ICD-10-CM

## 2022-03-17 PROCEDURE — G8536 NO DOC ELDER MAL SCRN: HCPCS | Performed by: INTERNAL MEDICINE

## 2022-03-17 PROCEDURE — G8419 CALC BMI OUT NRM PARAM NOF/U: HCPCS | Performed by: INTERNAL MEDICINE

## 2022-03-17 PROCEDURE — G0463 HOSPITAL OUTPT CLINIC VISIT: HCPCS | Performed by: INTERNAL MEDICINE

## 2022-03-17 PROCEDURE — 99213 OFFICE O/P EST LOW 20 MIN: CPT | Performed by: INTERNAL MEDICINE

## 2022-03-17 PROCEDURE — G8510 SCR DEP NEG, NO PLAN REQD: HCPCS | Performed by: INTERNAL MEDICINE

## 2022-03-17 PROCEDURE — 3017F COLORECTAL CA SCREEN DOC REV: CPT | Performed by: INTERNAL MEDICINE

## 2022-03-17 PROCEDURE — G8427 DOCREV CUR MEDS BY ELIG CLIN: HCPCS | Performed by: INTERNAL MEDICINE

## 2022-03-17 PROCEDURE — 1101F PT FALLS ASSESS-DOCD LE1/YR: CPT | Performed by: INTERNAL MEDICINE

## 2022-03-17 NOTE — PROGRESS NOTES
[address] [facility]      Araceli Reich MD, Shen Daily, Urban Salgado MD, MPH      RAFFY Lawson-SANDRA De La Garza, [facility]     Glenny Bailey, [facility]   Christy Rushing, FNP-C    Estefanía Beckett, [facility]       Glenis Larson [address]    at [address], [address] [place] [address], [facility] 22.    [phone]    FAX: [address] [place] [address]    at [address], [address], [address]    [phone]    FAX: [phone]       Patient Care Team:  Alcira Fulton MD as PCP - General (Internal Medicine)  Alcira Fulton MD as PCP - [facility]  Kat Kong MD as Physician (Cardiology)  Adriel Ferguson MD as Physician (Cardiology)  Anastasia Mcknight MD as Surgeon (General Surgery)  Farhan Fairbanks MD as Surgeon (General Surgery)      Problem List  Date Reviewed: 1/24/2022          Codes Class Noted    Left lower quadrant abdominal pain ICD-10-CM: R10.32  ICD-9-CM: 789.04  1/24/2022        Other chronic pancreatitis ([facility] ICD-10-CM: K86.1  ICD-9-CM: 577.1  1/24/2022        Fatty liver ICD-10-CM: K76.0  ICD-9-CM: 571.8  8/22/2021        Dilated cbd, acquired ICD-10-CM: K83.8  ICD-9-CM: 576.8  2/18/8129        H/O umbilical hernia repair DGF-43-HN: Z98.890, Z87.19  ICD-9-CM: V15.29  8/22/2021        History of Nissen fundoplication GGA-12-AM: I73.550  ICD-9-CM: V15.29  7/11/2021        H/O shoulder surgery ICD-10-CM: Z98.890  ICD-9-CM: V45.89  3/14/2020        Tear of medial meniscus of left knee, current ICD-10-CM: U50.857K  ICD-9-CM: 836.0  2/26/2020        SSS (sick sinus syndrome) ([facility] 75.) ICD-10-CM: I49.5  ICD-9-CM: 427.81  7/30/2015        Abnormal EKG ICD-10-CM: R94.31  ICD-9-CM: 794.31  6/24/2015        GERD (gastroesophageal reflux disease) ICD-10-CM: K21.9  ICD-9-CM: 530.81  1/31/2015              Dov Banuelos is being seen at [facility] for management of non-specific abdominal pain. The active problem list, all pertinent past medical history, medications, endoscopic studies, radiologic findings and laboratory findings related to the liver disorder were reviewed and discussed with the patient. The patient is a 77 y.o.  male who develoepd RUQ/epigastric pain wht bloating in 11/2020. He had been following with GI and underwent endoscopic procedures without explanation for the pain. At some point he was thought to have fatty liver and was sent to us. Serologic evaluation for markers of chronic liver disease was negative. Fibroscan in 8/2021 was 10.0 kPa which correlates with Stage 3 bridging fibrosis. The CAP score of 305 suggests hepatic steatosis. Repeat Fibroscan in 1/2022 was only 3.3 kPa. CAP score was only 210. MRI performed in 7/2021 demonstrate mild dilation of the CBD and PD to the ampula without obvious mass or stones. Hepatic steatosis and liver cysts. EUS by Dr Luke El demonstrated atrophic pancreas,no mass, consistent with chronic pancreatitis. The patient has the following symptoms which could be attributed to the liver disorder:   Fatigue. Persistent abdominal pain and bloating has been a chronic complaint for years and is basically unchanged. The patient is not experiencing the following symptoms which are commonly seen in this liver disorder:   Fevers, chills,   nausea, vomiting. The patient completes all daily activities without any functional limitations. ASSESSMENT AND PLAN:  Fatty liver  Suspect the patient has fatty liver based upon imaging, Fiboscan CAP score, serologic studies that are negative for other causes of chronic liver disease,     A liver biopsy has not been performed.     Fibroscan in 8/2021 was 10.0 kPa and  suggesting fatty liver and Stage 3 bridging fibrosis  Fibroscan in 1/2022 was 3.3 with  suggesting no fatty liver and no fibrosis. Liver transaminases are normal.  ALP is normal.  Liver function is normal.  The platelet count is   normal.    There are no risk factors for NALFD. This suggests that he either does not have fatty liver at all, or that it is mild NAFL. Liver biopsy is not indicated at this time. The Fibroscan can be repeated annually or as often as clinically indicated to assess for fibrosis progression and/or regression. Abdominal pain, dilated CBD and PD  The patients chief complaint is chronic abdominal pain and bloating. He had followed with GI for years and now he says he cannot get a follow-up appointment and they will not return phone calls. MRI and MRCP demonstrate mild dilation of the CBD and PD.  EUS performed 10/2021 suggested an atrophic pancreas with no mass. He was being treated with creon without much improvement and stopped this. He wanted to discuss his abdominal pain and not his liver disease, if he even has any liver disease. Screening for Hepatocellular Carcinoma  HCC screening is not necessary if the patient has no evidence of cirrhosis. Treatment of other medical problems in patients with chronic liver disease  There are no contraindications for the patient to take most medications that are necessary for treatment of other medical issues. Counseling for alcohol in patients with chronic liver disease  The patient does not consume any significant amount of alcohol. Vaccinations   Routine vaccinations against other bacterial and viral agents can be performed as indicated. Annual flu vaccination should be administered if indicated.       ALLERGIES  Allergies   Allergen Reactions    Latex Itching     AND HIVES    Sulfa (Sulfonamide Antibiotics) Anaphylaxis    Acetaminophen Other (comments)    Broccoli Nausea and Vomiting    Chlorhexidine Rash    Diltiazem Nausea Only  Gluten Other (comments)     \" WANTS TO SLEEP 24 HOURS A DAY , AND IT AFFECTS MY HEART\", PT CAN'T ELABORATE.  Ibuprofen Nausea and Vomiting    Lactose Other (comments) and Hives     EXTREME INDIGESTION  EXTREME INDIGESTION    Lisinopril Other (comments)     Other reaction(s): face swelling   Other reaction(s): face swelling     Morphine Itching     Patient stated    Other Plant, Animal, Environmental Nausea and Vomiting     Pink food coloring    Percocet [Oxycodone-Acetaminophen] Itching    Propafenone Other (comments) and Nausea and Vomiting     Nausea and pain    Nausea and pain    Tapioca Nausea and Vomiting    Tramadol Nausea and Vomiting    Wheat Other (comments)     \" WANTS TO SLEEP 24 HOURS A DAY\"       MEDICATIONS  Current Outpatient Medications   Medication Sig    testosterone (ANDROGEL) 1 % (25 mg/2.5gram) glpk 25 mg by TransDERmal route daily.  diphenhydramine HCl (ALLERGY PO) Take  by mouth as needed.  Creon 3,000-9,500- 15,000 unit cpDR capsule  (Patient not taking: Reported on 3/17/2022)     No current facility-administered medications for this visit. SYSTEM REVIEW NOT RELATED TO LIVER DISEASE OR REVIEWED ABOVE:  Constitution systems: Negative for fever, chills, weight gain, weight loss. Eyes: Negative for visual changes. ENT: Negative for sore throat, painful swallowing. Respiratory: Negative for cough, hemoptysis, SOB. Cardiology: Negative for chest pain, palpitations. GI:  Negative for constipation or diarrhea. : Negative for urinary frequency, dysuria, hematuria, nocturia. Skin: Negative for rash. Hematology: Negative for easy bruising, blood clots. Musculo-skeletal: Negative for back pain, muscle pain, weakness. Neurologic: Negative for headaches, dizziness, vertigo, memory problems not related to HE. Psychology: Negative for anxiety, depression. FAMILY HISTORY:  The father  of colon cancer. The mother  at age 80 yers.     There is no family history of liver disease. SOCIAL HISTORY:  The patient is . The patient has 1 child. The patient has never used tobacco products. The patient has never consumed significant amounts of alcohol. The patient used to work as  for [employer] . PHYSICAL EXAMINATION:  Visit Vitals  /76 (BP 1 Location: Left upper arm, BP Patient Position: Sitting, BP Cuff Size: Adult)   Pulse 62   Temp (!) 96.6 °F (35.9 °C) (Temporal)   Resp 16   Ht 5' 10\" (1.778 m)   Wt 179 lb (81.2 kg)   SpO2 97%   BMI 25.68 kg/m²       General: No acute distress. Eyes: Sclera anicteric. ENT: No oral lesions. Thyroid normal.  Nodes: No adenopathy. Skin: No spider angiomata. No jaundice. No palmar erythema. Respiratory: Lungs clear to auscultation. Cardiovascular: Regular heart rate. No murmurs. No JVD. Abdomen: Soft non-tender, liver size normal to percussion/palpation. Spleen not palpable. No obvious ascites. Extremities: No edema. No muscle wasting. No gross arthritic changes. Neurologic: Alert and oriented. Cranial nerves grossly intact. No asterixis.     LABORATORY STUDIES:  Liver [place] of [address] Units 10/29/2021 7/1/2021   WBC 4.1 - 11.1 K/uL 5.6 6.3   ANC 1.8 - 8.0 K/UL 3.8 5.6   HGB 12.1 - 17.0 g/dL 14.9 14.8    - 400 K/uL 199 188   INR 0.9 - 1.2  1.0   AST 15 - 37 U/L 11 (L) 11   ALT 12 - 78 U/L 24 12   Alk Phos 45 - 117 U/L 61 65   Bili, Total 0.2 - 1.0 MG/DL 0.4 0.5   Bili, Direct 0.00 - 0.40 mg/dL  0.13   Albumin 3.5 - 5.0 g/dL 3.7 4.6   BUN 6 - 20 MG/DL 18 16   Creat 0.70 - 1.30 MG/DL 1.03 0.93   Creat (iSTAT) 0.6 - 1.3 mg/dL     Na 136 - 145 mmol/L 138 142   K 3.5 - 5.1 mmol/L 3.7 4.5   Cl 97 - 108 mmol/L 110 (H) 106   CO2 21 - 32 mmol/L 24 24   Glucose 65 - 100 mg/dL 113 (H) 137 (H)   Magnesium 1.6 - 2.4 mg/dL 2.4      SEROLOGIES:  Serologies Latest Ref Rng & Units 7/1/2021   Hep B Surface Ag Negative Negative   Hep B Core Ab, Total Negative Negative   Hep B Surface AB QL  Non Reactive   Hep C Ab 0.0 - 0.9 s/co ratio <0.1   Ferritin 30 - 400 ng/mL 52   Alpha-1 antitrypsin level 101 - 187 mg/dL 115     LIVER HISTOLOGY:  8/2021. FibroScan performed at [address]. EkPa was 10.0. IQR/med 18%. . The results suggested a fibrosis level of F3. The CAP score suggests there is hepatic steatosis. 1/2022. FibroScan performed at [address]. EkPa was 3.3. IQR/med 36%. . The results suggested a fibrosis level of F0. The CAP score suggests there is no significant hepatic steatosis. ENDOSCOPIC PROCEDURES:  10/2021. EUS. No CBD stricture, stones or sludge. Pancreas parenchyma atrophied with dilated PD suggesting chronic pancreatitis. RADIOLOGY:  11/2020. CT scan abdomen with IV contrast.  NOrmal appearing liver. Severllow density lesions in liver. The largest is 2.2 x 1.4 cm. Most likely cysts. 7/2021. MRI and MRCP. Changes consistent with fatty liver. No liver mass lesions. Mild dilation of CBD and PD. Normal spleen. No ascites. OTHER TESTING:  Not available or performed    FOLLOW-UP:  All of the issues listed above in the Assessment and Plan were discussed with the patient. All questions were answered. The patient expressed a clear understanding of the above. [address] [place] [address] in 12 months for repeat Fibroscan.       MD [address], [address] [place] [address] 22.  [phone]  [address] [place] [address]

## 2022-03-17 NOTE — PROGRESS NOTES
Identified pt with two pt identifiers(name and ). Reviewed record in preparation for visit and have obtained necessary documentation. Chief Complaint   Patient presents with    Fatty Liver     4 week f/u      Vitals:    22 1051   BP: 119/76   Pulse: 62   Resp: 16   Temp: (!) 96.6 °F (35.9 °C)   TempSrc: Temporal   SpO2: 97%   Weight: 179 lb (81.2 kg)   Height: 5' 10\" (1.778 m)   PainSc:   6   PainLoc: Abdomen     Patient states that every time that he eats he feels sick and has to lie down  Health Maintenance Review: Patient reminded of \"due or due soon\" health maintenance. I have asked the patient to contact his/her primary care provider (PCP) for follow-up on his/her health maintenance. Coordination of Care Questionnaire:  :   1) Have you been to an emergency room, urgent care, or hospitalized since your last visit? If yes, where when, and reason for visit? no       2. Have seen or consulted any other health care provider since your last visit? If yes, where when, and reason for visit? NO      Patient is accompanied by self I have received verbal consent from [address] to discuss any/all medical information while they are present in the room.

## 2022-03-17 NOTE — Clinical Note
4/18/2022    Patient: Malinda Banuelos   YOB: 1945   Date of Visit: 3/17/2022     Be Wen MD  [address] [place] [address]  Via Fax: [phone]    Dear Be Wen MD,      Thank you for referring Mr. Octavio Banuelos to [address] for evaluation. My notes for this consultation are attached. If you have questions, please do not hesitate to call me. I look forward to following your patient along with you.       Sincerely,    Mitchell Spencer MD

## 2022-03-18 PROBLEM — Z87.19 H/O UMBILICAL HERNIA REPAIR: Status: ACTIVE | Noted: 2021-08-22

## 2022-03-18 PROBLEM — Z98.890 H/O UMBILICAL HERNIA REPAIR: Status: ACTIVE | Noted: 2021-08-22

## 2022-03-18 PROBLEM — K86.1 OTHER CHRONIC PANCREATITIS (HCC): Status: ACTIVE | Noted: 2022-01-24

## 2022-03-18 PROBLEM — K76.0 FATTY LIVER: Status: ACTIVE | Noted: 2021-08-22

## 2022-03-18 PROBLEM — S83.242A TEAR OF MEDIAL MENISCUS OF LEFT KNEE, CURRENT: Status: ACTIVE | Noted: 2020-02-26

## 2022-03-19 PROBLEM — Z98.890 H/O SHOULDER SURGERY: Status: ACTIVE | Noted: 2020-03-14

## 2022-03-19 PROBLEM — K83.8 DILATED CBD, ACQUIRED: Status: ACTIVE | Noted: 2021-08-22

## 2022-03-19 PROBLEM — R10.32 LEFT LOWER QUADRANT ABDOMINAL PAIN: Status: ACTIVE | Noted: 2022-01-24

## 2022-03-20 PROBLEM — Z98.890 HISTORY OF NISSEN FUNDOPLICATION: Status: ACTIVE | Noted: 2021-07-11

## 2024-09-20 ENCOUNTER — APPOINTMENT (OUTPATIENT)
Facility: HOSPITAL | Age: 79
End: 2024-09-20
Payer: MEDICARE

## 2024-09-20 ENCOUNTER — HOSPITAL ENCOUNTER (EMERGENCY)
Facility: HOSPITAL | Age: 79
Discharge: HOME | End: 2024-09-20
Payer: MEDICARE

## 2024-09-20 VITALS
BODY MASS INDEX: 25.91 KG/M2 | WEIGHT: 180.56 LBS | TEMPERATURE: 99.1 F | HEART RATE: 54 BPM | DIASTOLIC BLOOD PRESSURE: 82 MMHG | SYSTOLIC BLOOD PRESSURE: 127 MMHG | RESPIRATION RATE: 17 BRPM | OXYGEN SATURATION: 93 %

## 2024-09-20 DIAGNOSIS — M79.672 LEFT FOOT PAIN: Primary | ICD-10-CM

## 2024-09-20 DIAGNOSIS — M79.89 FOOT SWELLING: ICD-10-CM

## 2024-09-20 PROCEDURE — 99284 EMERGENCY DEPT VISIT MOD MDM: CPT

## 2024-09-20 PROCEDURE — 73630 X-RAY EXAM OF FOOT: CPT

## 2024-09-20 PROCEDURE — 93971 EXTREMITY STUDY: CPT

## 2024-09-20 RX ORDER — NAPROXEN 500 MG/1
500 TABLET ORAL 2 TIMES DAILY
Qty: 20 TABLET | Refills: 0 | Status: SHIPPED | OUTPATIENT
Start: 2024-09-20

## 2024-09-20 ASSESSMENT — PAIN DESCRIPTION - ORIENTATION: ORIENTATION: LEFT

## 2024-09-20 ASSESSMENT — LIFESTYLE VARIABLES
HOW OFTEN DO YOU HAVE A DRINK CONTAINING ALCOHOL: MONTHLY OR LESS
HOW MANY STANDARD DRINKS CONTAINING ALCOHOL DO YOU HAVE ON A TYPICAL DAY: 1 OR 2

## 2024-09-20 ASSESSMENT — PAIN DESCRIPTION - LOCATION: LOCATION: LEG

## 2024-09-20 ASSESSMENT — PAIN SCALES - GENERAL: PAINLEVEL_OUTOF10: 7
